# Patient Record
Sex: FEMALE | Race: OTHER
[De-identification: names, ages, dates, MRNs, and addresses within clinical notes are randomized per-mention and may not be internally consistent; named-entity substitution may affect disease eponyms.]

---

## 2017-09-05 ENCOUNTER — HOSPITAL ENCOUNTER (INPATIENT)
Dept: HOSPITAL 10 - E/R | Age: 82
LOS: 4 days | Discharge: ANOTHER HOSPITAL | DRG: 871 | End: 2017-09-09
Payer: MEDICARE

## 2017-09-05 VITALS — DIASTOLIC BLOOD PRESSURE: 93 MMHG | RESPIRATION RATE: 17 BRPM | SYSTOLIC BLOOD PRESSURE: 127 MMHG | HEART RATE: 86 BPM

## 2017-09-05 VITALS — HEART RATE: 70 BPM | DIASTOLIC BLOOD PRESSURE: 85 MMHG | RESPIRATION RATE: 25 BRPM | SYSTOLIC BLOOD PRESSURE: 125 MMHG

## 2017-09-05 VITALS — DIASTOLIC BLOOD PRESSURE: 67 MMHG | HEART RATE: 73 BPM | RESPIRATION RATE: 19 BRPM | SYSTOLIC BLOOD PRESSURE: 116 MMHG

## 2017-09-05 VITALS — DIASTOLIC BLOOD PRESSURE: 77 MMHG | RESPIRATION RATE: 21 BRPM | HEART RATE: 78 BPM | SYSTOLIC BLOOD PRESSURE: 131 MMHG

## 2017-09-05 VITALS — HEART RATE: 74 BPM | SYSTOLIC BLOOD PRESSURE: 106 MMHG | RESPIRATION RATE: 17 BRPM | DIASTOLIC BLOOD PRESSURE: 80 MMHG

## 2017-09-05 VITALS — RESPIRATION RATE: 19 BRPM | DIASTOLIC BLOOD PRESSURE: 68 MMHG | SYSTOLIC BLOOD PRESSURE: 98 MMHG | HEART RATE: 83 BPM

## 2017-09-05 VITALS — SYSTOLIC BLOOD PRESSURE: 117 MMHG | RESPIRATION RATE: 20 BRPM | HEART RATE: 78 BPM | DIASTOLIC BLOOD PRESSURE: 71 MMHG

## 2017-09-05 VITALS — SYSTOLIC BLOOD PRESSURE: 115 MMHG | DIASTOLIC BLOOD PRESSURE: 69 MMHG | HEART RATE: 91 BPM | RESPIRATION RATE: 20 BRPM

## 2017-09-05 VITALS — DIASTOLIC BLOOD PRESSURE: 71 MMHG | SYSTOLIC BLOOD PRESSURE: 115 MMHG | RESPIRATION RATE: 24 BRPM | HEART RATE: 79 BPM

## 2017-09-05 VITALS — DIASTOLIC BLOOD PRESSURE: 67 MMHG | HEART RATE: 81 BPM | SYSTOLIC BLOOD PRESSURE: 109 MMHG | RESPIRATION RATE: 20 BRPM

## 2017-09-05 VITALS — DIASTOLIC BLOOD PRESSURE: 73 MMHG | SYSTOLIC BLOOD PRESSURE: 118 MMHG | HEART RATE: 72 BPM | RESPIRATION RATE: 24 BRPM

## 2017-09-05 VITALS — RESPIRATION RATE: 20 BRPM | HEART RATE: 70 BPM | DIASTOLIC BLOOD PRESSURE: 65 MMHG | SYSTOLIC BLOOD PRESSURE: 122 MMHG

## 2017-09-05 VITALS — RESPIRATION RATE: 19 BRPM | DIASTOLIC BLOOD PRESSURE: 68 MMHG | HEART RATE: 75 BPM | SYSTOLIC BLOOD PRESSURE: 112 MMHG

## 2017-09-05 VITALS — HEART RATE: 74 BPM | DIASTOLIC BLOOD PRESSURE: 92 MMHG | RESPIRATION RATE: 24 BRPM | SYSTOLIC BLOOD PRESSURE: 141 MMHG

## 2017-09-05 VITALS — RESPIRATION RATE: 19 BRPM | DIASTOLIC BLOOD PRESSURE: 88 MMHG | SYSTOLIC BLOOD PRESSURE: 113 MMHG | HEART RATE: 81 BPM

## 2017-09-05 VITALS — RESPIRATION RATE: 22 BRPM | SYSTOLIC BLOOD PRESSURE: 117 MMHG | DIASTOLIC BLOOD PRESSURE: 74 MMHG | HEART RATE: 83 BPM

## 2017-09-05 VITALS — RESPIRATION RATE: 22 BRPM | HEART RATE: 85 BPM | SYSTOLIC BLOOD PRESSURE: 108 MMHG | DIASTOLIC BLOOD PRESSURE: 70 MMHG

## 2017-09-05 VITALS — HEART RATE: 82 BPM | RESPIRATION RATE: 21 BRPM | SYSTOLIC BLOOD PRESSURE: 134 MMHG | DIASTOLIC BLOOD PRESSURE: 81 MMHG

## 2017-09-05 VITALS — HEART RATE: 72 BPM | RESPIRATION RATE: 24 BRPM | DIASTOLIC BLOOD PRESSURE: 63 MMHG | SYSTOLIC BLOOD PRESSURE: 123 MMHG

## 2017-09-05 VITALS — SYSTOLIC BLOOD PRESSURE: 111 MMHG | DIASTOLIC BLOOD PRESSURE: 62 MMHG | HEART RATE: 75 BPM | RESPIRATION RATE: 20 BRPM

## 2017-09-05 VITALS — RESPIRATION RATE: 12 BRPM | HEART RATE: 73 BPM | DIASTOLIC BLOOD PRESSURE: 76 MMHG | SYSTOLIC BLOOD PRESSURE: 122 MMHG

## 2017-09-05 VITALS — DIASTOLIC BLOOD PRESSURE: 59 MMHG | SYSTOLIC BLOOD PRESSURE: 94 MMHG | HEART RATE: 79 BPM | RESPIRATION RATE: 20 BRPM

## 2017-09-05 VITALS
HEIGHT: 60 IN | BODY MASS INDEX: 32.12 KG/M2 | BODY MASS INDEX: 32.12 KG/M2 | HEIGHT: 60 IN | WEIGHT: 163.58 LBS | WEIGHT: 163.58 LBS

## 2017-09-05 VITALS — HEART RATE: 82 BPM | RESPIRATION RATE: 20 BRPM | DIASTOLIC BLOOD PRESSURE: 64 MMHG | SYSTOLIC BLOOD PRESSURE: 112 MMHG

## 2017-09-05 VITALS — SYSTOLIC BLOOD PRESSURE: 100 MMHG | DIASTOLIC BLOOD PRESSURE: 73 MMHG | HEART RATE: 93 BPM | RESPIRATION RATE: 24 BRPM

## 2017-09-05 VITALS — RESPIRATION RATE: 22 BRPM | HEART RATE: 72 BPM | SYSTOLIC BLOOD PRESSURE: 120 MMHG | DIASTOLIC BLOOD PRESSURE: 73 MMHG

## 2017-09-05 VITALS — HEART RATE: 74 BPM | RESPIRATION RATE: 12 BRPM | DIASTOLIC BLOOD PRESSURE: 79 MMHG | SYSTOLIC BLOOD PRESSURE: 129 MMHG

## 2017-09-05 VITALS — RESPIRATION RATE: 16 BRPM | SYSTOLIC BLOOD PRESSURE: 125 MMHG | HEART RATE: 79 BPM | DIASTOLIC BLOOD PRESSURE: 83 MMHG

## 2017-09-05 VITALS — RESPIRATION RATE: 21 BRPM | DIASTOLIC BLOOD PRESSURE: 65 MMHG | SYSTOLIC BLOOD PRESSURE: 101 MMHG | HEART RATE: 74 BPM

## 2017-09-05 VITALS — HEART RATE: 79 BPM | DIASTOLIC BLOOD PRESSURE: 86 MMHG | RESPIRATION RATE: 11 BRPM | SYSTOLIC BLOOD PRESSURE: 112 MMHG

## 2017-09-05 VITALS — RESPIRATION RATE: 19 BRPM | HEART RATE: 82 BPM | DIASTOLIC BLOOD PRESSURE: 83 MMHG | SYSTOLIC BLOOD PRESSURE: 125 MMHG

## 2017-09-05 VITALS — HEART RATE: 76 BPM | DIASTOLIC BLOOD PRESSURE: 89 MMHG | SYSTOLIC BLOOD PRESSURE: 133 MMHG | RESPIRATION RATE: 24 BRPM

## 2017-09-05 VITALS — DIASTOLIC BLOOD PRESSURE: 77 MMHG | HEART RATE: 80 BPM | SYSTOLIC BLOOD PRESSURE: 117 MMHG | RESPIRATION RATE: 22 BRPM

## 2017-09-05 VITALS — SYSTOLIC BLOOD PRESSURE: 121 MMHG | RESPIRATION RATE: 25 BRPM | DIASTOLIC BLOOD PRESSURE: 89 MMHG | HEART RATE: 73 BPM

## 2017-09-05 VITALS — DIASTOLIC BLOOD PRESSURE: 65 MMHG | RESPIRATION RATE: 22 BRPM | SYSTOLIC BLOOD PRESSURE: 110 MMHG | HEART RATE: 77 BPM

## 2017-09-05 VITALS — RESPIRATION RATE: 21 BRPM | SYSTOLIC BLOOD PRESSURE: 137 MMHG | DIASTOLIC BLOOD PRESSURE: 84 MMHG | HEART RATE: 82 BPM

## 2017-09-05 VITALS — HEART RATE: 77 BPM | SYSTOLIC BLOOD PRESSURE: 107 MMHG | RESPIRATION RATE: 25 BRPM | DIASTOLIC BLOOD PRESSURE: 69 MMHG

## 2017-09-05 VITALS — DIASTOLIC BLOOD PRESSURE: 93 MMHG | HEART RATE: 74 BPM | RESPIRATION RATE: 20 BRPM | SYSTOLIC BLOOD PRESSURE: 130 MMHG

## 2017-09-05 VITALS — RESPIRATION RATE: 22 BRPM | SYSTOLIC BLOOD PRESSURE: 135 MMHG | DIASTOLIC BLOOD PRESSURE: 78 MMHG | HEART RATE: 78 BPM

## 2017-09-05 VITALS — DIASTOLIC BLOOD PRESSURE: 96 MMHG | SYSTOLIC BLOOD PRESSURE: 138 MMHG | HEART RATE: 73 BPM | RESPIRATION RATE: 24 BRPM

## 2017-09-05 VITALS — DIASTOLIC BLOOD PRESSURE: 66 MMHG | RESPIRATION RATE: 22 BRPM | HEART RATE: 72 BPM | SYSTOLIC BLOOD PRESSURE: 104 MMHG

## 2017-09-05 VITALS — SYSTOLIC BLOOD PRESSURE: 115 MMHG | HEART RATE: 76 BPM | DIASTOLIC BLOOD PRESSURE: 82 MMHG | RESPIRATION RATE: 21 BRPM

## 2017-09-05 VITALS — RESPIRATION RATE: 22 BRPM | SYSTOLIC BLOOD PRESSURE: 115 MMHG | HEART RATE: 80 BPM | DIASTOLIC BLOOD PRESSURE: 61 MMHG

## 2017-09-05 VITALS — RESPIRATION RATE: 23 BRPM | SYSTOLIC BLOOD PRESSURE: 101 MMHG | DIASTOLIC BLOOD PRESSURE: 77 MMHG | HEART RATE: 88 BPM

## 2017-09-05 VITALS — SYSTOLIC BLOOD PRESSURE: 106 MMHG | DIASTOLIC BLOOD PRESSURE: 71 MMHG | HEART RATE: 77 BPM | RESPIRATION RATE: 21 BRPM

## 2017-09-05 VITALS — TEMPERATURE: 98.4 F

## 2017-09-05 VITALS — RESPIRATION RATE: 25 BRPM | SYSTOLIC BLOOD PRESSURE: 113 MMHG | HEART RATE: 78 BPM | DIASTOLIC BLOOD PRESSURE: 61 MMHG

## 2017-09-05 VITALS — SYSTOLIC BLOOD PRESSURE: 120 MMHG | RESPIRATION RATE: 20 BRPM | DIASTOLIC BLOOD PRESSURE: 62 MMHG | HEART RATE: 78 BPM

## 2017-09-05 VITALS — RESPIRATION RATE: 25 BRPM | DIASTOLIC BLOOD PRESSURE: 74 MMHG | SYSTOLIC BLOOD PRESSURE: 131 MMHG | HEART RATE: 74 BPM

## 2017-09-05 VITALS — DIASTOLIC BLOOD PRESSURE: 69 MMHG | HEART RATE: 72 BPM | RESPIRATION RATE: 23 BRPM | SYSTOLIC BLOOD PRESSURE: 118 MMHG

## 2017-09-05 VITALS — HEART RATE: 76 BPM | RESPIRATION RATE: 25 BRPM | DIASTOLIC BLOOD PRESSURE: 85 MMHG | SYSTOLIC BLOOD PRESSURE: 124 MMHG

## 2017-09-05 VITALS — HEART RATE: 77 BPM | RESPIRATION RATE: 17 BRPM | SYSTOLIC BLOOD PRESSURE: 100 MMHG | DIASTOLIC BLOOD PRESSURE: 43 MMHG

## 2017-09-05 VITALS — DIASTOLIC BLOOD PRESSURE: 60 MMHG | RESPIRATION RATE: 16 BRPM | SYSTOLIC BLOOD PRESSURE: 103 MMHG | HEART RATE: 73 BPM

## 2017-09-05 VITALS — DIASTOLIC BLOOD PRESSURE: 64 MMHG | HEART RATE: 79 BPM | RESPIRATION RATE: 25 BRPM | SYSTOLIC BLOOD PRESSURE: 104 MMHG

## 2017-09-05 VITALS — SYSTOLIC BLOOD PRESSURE: 112 MMHG | HEART RATE: 87 BPM | RESPIRATION RATE: 25 BRPM | DIASTOLIC BLOOD PRESSURE: 73 MMHG

## 2017-09-05 VITALS — DIASTOLIC BLOOD PRESSURE: 72 MMHG | RESPIRATION RATE: 18 BRPM | HEART RATE: 74 BPM | SYSTOLIC BLOOD PRESSURE: 111 MMHG

## 2017-09-05 VITALS — RESPIRATION RATE: 22 BRPM | HEART RATE: 79 BPM | DIASTOLIC BLOOD PRESSURE: 66 MMHG | SYSTOLIC BLOOD PRESSURE: 112 MMHG

## 2017-09-05 VITALS — SYSTOLIC BLOOD PRESSURE: 116 MMHG | HEART RATE: 75 BPM | DIASTOLIC BLOOD PRESSURE: 88 MMHG | RESPIRATION RATE: 23 BRPM

## 2017-09-05 VITALS — DIASTOLIC BLOOD PRESSURE: 59 MMHG | SYSTOLIC BLOOD PRESSURE: 108 MMHG | HEART RATE: 77 BPM | RESPIRATION RATE: 21 BRPM

## 2017-09-05 VITALS — SYSTOLIC BLOOD PRESSURE: 121 MMHG | HEART RATE: 82 BPM | DIASTOLIC BLOOD PRESSURE: 98 MMHG | RESPIRATION RATE: 20 BRPM

## 2017-09-05 DIAGNOSIS — B96.5: ICD-10-CM

## 2017-09-05 DIAGNOSIS — Z95.1: ICD-10-CM

## 2017-09-05 DIAGNOSIS — Z79.84: ICD-10-CM

## 2017-09-05 DIAGNOSIS — E11.9: ICD-10-CM

## 2017-09-05 DIAGNOSIS — B96.20: ICD-10-CM

## 2017-09-05 DIAGNOSIS — I21.3: ICD-10-CM

## 2017-09-05 DIAGNOSIS — A41.9: Primary | ICD-10-CM

## 2017-09-05 DIAGNOSIS — I10: ICD-10-CM

## 2017-09-05 DIAGNOSIS — N39.0: ICD-10-CM

## 2017-09-05 DIAGNOSIS — I24.8: ICD-10-CM

## 2017-09-05 DIAGNOSIS — K80.30: ICD-10-CM

## 2017-09-05 DIAGNOSIS — R65.21: ICD-10-CM

## 2017-09-05 DIAGNOSIS — I25.10: ICD-10-CM

## 2017-09-05 DIAGNOSIS — Z79.02: ICD-10-CM

## 2017-09-05 DIAGNOSIS — I48.2: ICD-10-CM

## 2017-09-05 DIAGNOSIS — Z95.2: ICD-10-CM

## 2017-09-05 DIAGNOSIS — E78.5: ICD-10-CM

## 2017-09-05 DIAGNOSIS — Z79.82: ICD-10-CM

## 2017-09-05 LAB
ABNORMAL IP MESSAGE: 1
ABNORMAL IP MESSAGE: 1
ADD UMIC: YES
ALBUMIN SERPL-MCNC: 1.8 G/DL (ref 3.3–4.9)
ALBUMIN SERPL-MCNC: 3.7 G/DL (ref 3.3–4.9)
ALBUMIN/GLOB SERPL: 0.86 {RATIO}
ALP SERPL-CCNC: 128 IU/L (ref 42–121)
ALP SERPL-CCNC: 342 IU/L (ref 42–121)
ALT SERPL-CCNC: 216 IU/L (ref 13–69)
ALT SERPL-CCNC: 426 IU/L (ref 13–69)
ANION GAP SERPL CALC-SCNC: 14 MMOL/L (ref 8–16)
ANION GAP SERPL CALC-SCNC: 18 MMOL/L (ref 8–16)
APTT BLD: 32.4 SEC (ref 25–35)
APTT BLD: 35.5 SEC (ref 25–35)
AST SERPL-CCNC: 150 IU/L (ref 15–46)
AST SERPL-CCNC: 350 IU/L (ref 15–46)
BACTERIA #/AREA URNS HPF: (no result) /HPF
BASOPHILS # BLD AUTO: 0 10^3/UL (ref 0–0.1)
BASOPHILS # BLD AUTO: 0 10^3/UL (ref 0–0.1)
BASOPHILS NFR BLD: 0.1 % (ref 0–2)
BASOPHILS NFR BLD: 0.2 % (ref 0–2)
BILIRUB DIRECT SERPL-MCNC: 1.2 MG/DL (ref 0–0.2)
BILIRUB DIRECT SERPL-MCNC: 2.9 MG/DL (ref 0–0.2)
BILIRUB SERPL-MCNC: 2 MG/DL (ref 0.2–1.3)
BILIRUB SERPL-MCNC: 4.6 MG/DL (ref 0.2–1.3)
BUN SERPL-MCNC: 28 MG/DL (ref 7–20)
BUN SERPL-MCNC: 28 MG/DL (ref 7–20)
CALCIUM SERPL-MCNC: 7.3 MG/DL (ref 8.4–10.2)
CALCIUM SERPL-MCNC: 8.9 MG/DL (ref 8.4–10.2)
CHLORIDE SERPL-SCNC: 104 MMOL/L (ref 97–110)
CHLORIDE SERPL-SCNC: 110 MMOL/L (ref 97–110)
CK MB SERPL-MCNC: 1.46 NG/ML (ref 0–2.4)
CK MB SERPL-MCNC: 1.66 NG/ML (ref 0–2.4)
CK MB SERPL-MCNC: 1.74 NG/ML (ref 0–2.4)
CK SERPL-CCNC: 34 IU/L (ref 23–200)
CK SERPL-CCNC: 37 IU/L (ref 23–200)
CK SERPL-CCNC: 39 IU/L (ref 23–200)
CO2 SERPL-SCNC: 19 MMOL/L (ref 21–31)
CO2 SERPL-SCNC: 19 MMOL/L (ref 21–31)
COLOR UR: (no result)
CREAT SERPL-MCNC: 1.24 MG/DL (ref 0.44–1)
CREAT SERPL-MCNC: 1.37 MG/DL (ref 0.44–1)
EOSINOPHIL # BLD: 0 10^3/UL (ref 0–0.5)
EOSINOPHIL # BLD: 0.1 10^3/UL (ref 0–0.5)
EOSINOPHIL NFR BLD: 0.1 % (ref 0–7)
EOSINOPHIL NFR BLD: 1 % (ref 0–7)
ERYTHROCYTE [DISTWIDTH] IN BLOOD BY AUTOMATED COUNT: 14.6 % (ref 11.5–14.5)
ERYTHROCYTE [DISTWIDTH] IN BLOOD BY AUTOMATED COUNT: 15 % (ref 11.5–14.5)
GLOBULIN SER-MCNC: 4.3 G/DL (ref 1.3–3.2)
GLUCOSE SERPL-MCNC: 140 MG/DL (ref 70–220)
GLUCOSE SERPL-MCNC: 174 MG/DL (ref 70–220)
GLUCOSE UR STRIP-MCNC: NEGATIVE MG/DL
HCT VFR BLD CALC: 32.8 % (ref 37–47)
HCT VFR BLD CALC: 37.4 % (ref 37–47)
HGB BLD-MCNC: 10.4 G/DL (ref 12–16)
HGB BLD-MCNC: 12.7 G/DL (ref 12–16)
INR PPP: 1.36
INR PPP: 1.42
KETONES UR STRIP.AUTO-MCNC: NEGATIVE MG/DL
LYMPHOCYTES # BLD AUTO: 0.3 10^3/UL (ref 0.8–2.9)
LYMPHOCYTES # BLD AUTO: 0.5 10^3/UL (ref 0.8–2.9)
LYMPHOCYTES NFR BLD AUTO: 4.1 % (ref 15–51)
LYMPHOCYTES NFR BLD AUTO: 6.2 % (ref 15–51)
MCH RBC QN AUTO: 27.7 PG (ref 29–33)
MCH RBC QN AUTO: 28.7 PG (ref 29–33)
MCHC RBC AUTO-ENTMCNC: 31.7 G/DL (ref 32–37)
MCHC RBC AUTO-ENTMCNC: 34 G/DL (ref 32–37)
MCV RBC AUTO: 84.6 FL (ref 82–101)
MCV RBC AUTO: 87.5 FL (ref 82–101)
MONOCYTES # BLD: 0.1 10^3/UL (ref 0.3–0.9)
MONOCYTES # BLD: 0.5 10^3/UL (ref 0.3–0.9)
MONOCYTES NFR BLD: 1.9 % (ref 0–11)
MONOCYTES NFR BLD: 4.1 % (ref 0–11)
MUCOUS THREADS #/AREA URNS HPF: (no result) /HPF
NEUTROPHILS NFR BLD AUTO: 90.3 % (ref 39–77)
NEUTROPHILS NFR BLD AUTO: 91.1 % (ref 39–77)
NITRITE UR QL STRIP.AUTO: NEGATIVE MG/DL
NRBC # BLD MANUAL: 0 10^3/UL (ref 0–0)
NRBC # BLD MANUAL: 0 10^3/UL (ref 0–0)
NRBC BLD AUTO-RTO: 0 /100WBC (ref 0–0)
NRBC BLD AUTO-RTO: 0 /100WBC (ref 0–0)
PLATELET # BLD: 109 10^3/UL (ref 140–415)
PLATELET # BLD: 149 10^3/UL (ref 140–415)
PMV BLD AUTO: 9.5 FL (ref 7.4–10.4)
PMV BLD AUTO: 9.7 FL (ref 7.4–10.4)
POSITIVE DIFF: (no result)
POSITIVE DIFF: (no result)
POTASSIUM SERPL-SCNC: 3.7 MMOL/L (ref 3.5–5.1)
POTASSIUM SERPL-SCNC: 4 MMOL/L (ref 3.5–5.1)
PROT SERPL-MCNC: 4.3 G/DL (ref 6.1–8.1)
PROT SERPL-MCNC: 8 G/DL (ref 6.1–8.1)
PROTHROMBIN TIME: 16.8 SEC (ref 12.2–14.2)
PROTHROMBIN TIME: 17.4 SEC (ref 12.2–14.2)
PT RATIO: 1.3
PT RATIO: 1.4
RBC # BLD AUTO: 3.75 10^6/UL (ref 4.2–5.4)
RBC # BLD AUTO: 4.42 10^6/UL (ref 4.2–5.4)
RBC # UR AUTO: (no result) MG/DL
SODIUM SERPL-SCNC: 137 MMOL/L (ref 135–144)
SODIUM SERPL-SCNC: 139 MMOL/L (ref 135–144)
SQUAMOUS #/AREA URNS HPF: (no result) /HPF
TROPONIN I SERPL-MCNC: 0.04 NG/ML (ref 0–0.12)
TROPONIN I SERPL-MCNC: 0.12 NG/ML (ref 0–0.12)
TROPONIN I SERPL-MCNC: 0.13 NG/ML (ref 0–0.12)
TROPONIN I SERPL-MCNC: 0.22 NG/ML (ref 0–0.12)
UR ASCORBIC ACID: NEGATIVE MG/DL
UR BILIRUBIN (DIP): (no result) MG/DL
UR BUDDING YEAST: (no result) /HPF
UR CLARITY: (no result)
UR PH (DIP): 5 (ref 5–9)
UR RBC: 40 /HPF (ref 0–5)
UR SPECIFIC GRAVITY (DIP): 1.02 (ref 1–1.03)
UR TOTAL PROTEIN (DIP): (no result) MG/DL
URINE BLOOD (DIP) POC: (no result)
UROBILINOGEN UR STRIP-ACNC: (no result) MG/DL
WBC # BLD AUTO: 11.5 10^3/UL (ref 4.8–10.8)
WBC # BLD AUTO: 5.1 10^3/UL (ref 4.8–10.8)
WBC # UR STRIP: (no result) LEU/UL

## 2017-09-05 PROCEDURE — 84443 ASSAY THYROID STIM HORMONE: CPT

## 2017-09-05 PROCEDURE — C9113 INJ PANTOPRAZOLE SODIUM, VIA: HCPCS

## 2017-09-05 PROCEDURE — 96375 TX/PRO/DX INJ NEW DRUG ADDON: CPT

## 2017-09-05 PROCEDURE — 85025 COMPLETE CBC W/AUTO DIFF WBC: CPT

## 2017-09-05 PROCEDURE — 87040 BLOOD CULTURE FOR BACTERIA: CPT

## 2017-09-05 PROCEDURE — 83605 ASSAY OF LACTIC ACID: CPT

## 2017-09-05 PROCEDURE — 84100 ASSAY OF PHOSPHORUS: CPT

## 2017-09-05 PROCEDURE — 93005 ELECTROCARDIOGRAM TRACING: CPT

## 2017-09-05 PROCEDURE — 92610 EVALUATE SWALLOWING FUNCTION: CPT

## 2017-09-05 PROCEDURE — 85610 PROTHROMBIN TIME: CPT

## 2017-09-05 PROCEDURE — 81003 URINALYSIS AUTO W/O SCOPE: CPT

## 2017-09-05 PROCEDURE — 80162 ASSAY OF DIGOXIN TOTAL: CPT

## 2017-09-05 PROCEDURE — 80048 BASIC METABOLIC PNL TOTAL CA: CPT

## 2017-09-05 PROCEDURE — 86901 BLOOD TYPING SEROLOGIC RH(D): CPT

## 2017-09-05 PROCEDURE — P9059 PLASMA, FRZ BETWEEN 8-24HOUR: HCPCS

## 2017-09-05 PROCEDURE — 36430 TRANSFUSION BLD/BLD COMPNT: CPT

## 2017-09-05 PROCEDURE — 71010: CPT

## 2017-09-05 PROCEDURE — 06HM33Z INSERTION OF INFUSION DEVICE INTO RIGHT FEMORAL VEIN, PERCUTANEOUS APPROACH: ICD-10-PCS

## 2017-09-05 PROCEDURE — 82150 ASSAY OF AMYLASE: CPT

## 2017-09-05 PROCEDURE — 87086 URINE CULTURE/COLONY COUNT: CPT

## 2017-09-05 PROCEDURE — 83735 ASSAY OF MAGNESIUM: CPT

## 2017-09-05 PROCEDURE — 74330 X-RAY BILE/PANC ENDOSCOPY: CPT

## 2017-09-05 PROCEDURE — 80076 HEPATIC FUNCTION PANEL: CPT

## 2017-09-05 PROCEDURE — 82550 ASSAY OF CK (CPK): CPT

## 2017-09-05 PROCEDURE — 85730 THROMBOPLASTIN TIME PARTIAL: CPT

## 2017-09-05 PROCEDURE — 84484 ASSAY OF TROPONIN QUANT: CPT

## 2017-09-05 PROCEDURE — 36415 COLL VENOUS BLD VENIPUNCTURE: CPT

## 2017-09-05 PROCEDURE — 83690 ASSAY OF LIPASE: CPT

## 2017-09-05 PROCEDURE — C2617 STENT, NON-COR, TEM W/O DEL: HCPCS

## 2017-09-05 PROCEDURE — 86900 BLOOD TYPING SEROLOGIC ABO: CPT

## 2017-09-05 PROCEDURE — 74176 CT ABD & PELVIS W/O CONTRAST: CPT

## 2017-09-05 PROCEDURE — 80053 COMPREHEN METABOLIC PANEL: CPT

## 2017-09-05 PROCEDURE — 82553 CREATINE MB FRACTION: CPT

## 2017-09-05 PROCEDURE — 93306 TTE W/DOPPLER COMPLETE: CPT

## 2017-09-05 PROCEDURE — 83036 HEMOGLOBIN GLYCOSYLATED A1C: CPT

## 2017-09-05 PROCEDURE — B54BZZA ULTRASONOGRAPHY OF RIGHT LOWER EXTREMITY VEINS, GUIDANCE: ICD-10-PCS

## 2017-09-05 PROCEDURE — 86850 RBC ANTIBODY SCREEN: CPT

## 2017-09-05 PROCEDURE — 82962 GLUCOSE BLOOD TEST: CPT

## 2017-09-05 PROCEDURE — 81001 URINALYSIS AUTO W/SCOPE: CPT

## 2017-09-05 PROCEDURE — 96365 THER/PROPH/DIAG IV INF INIT: CPT

## 2017-09-05 RX ADMIN — SERTRALINE HYDROCHLORIDE SCH MG: 50 TABLET ORAL at 21:17

## 2017-09-05 RX ADMIN — FLUCONAZOLE, SODIUM CHLORIDE SCH MLS/HR: 2 INJECTION INTRAVENOUS at 08:25

## 2017-09-05 RX ADMIN — FOLIC ACID SCH MLS/HR: 5 INJECTION, SOLUTION INTRAMUSCULAR; INTRAVENOUS; SUBCUTANEOUS at 10:15

## 2017-09-05 RX ADMIN — FOLIC ACID SCH MLS/HR: 5 INJECTION, SOLUTION INTRAMUSCULAR; INTRAVENOUS; SUBCUTANEOUS at 22:30

## 2017-09-05 RX ADMIN — MULTIPLE VITAMINS W/ MINERALS TAB SCH TAB: TAB at 10:14

## 2017-09-05 RX ADMIN — FERROUS SULFATE TAB 325 MG (65 MG ELEMENTAL FE) SCH MG: 325 (65 FE) TAB at 10:14

## 2017-09-05 RX ADMIN — Medication STA MLS/HR: at 08:35

## 2017-09-05 RX ADMIN — Medication STA MLS/HR: at 07:37

## 2017-09-05 RX ADMIN — MEROPENEM SCH MLS/HR: 1 INJECTION, POWDER, FOR SOLUTION INTRAVENOUS at 21:22

## 2017-09-05 RX ADMIN — ASPIRIN SCH MG: 81 TABLET, COATED ORAL at 10:14

## 2017-09-05 RX ADMIN — MEROPENEM SCH MLS/HR: 1 INJECTION, POWDER, FOR SOLUTION INTRAVENOUS at 11:15

## 2017-09-05 RX ADMIN — MAGNESIUM OXIDE TAB 400 MG (241.3 MG ELEMENTAL MG) SCH MG: 400 (241.3 MG) TAB at 10:14

## 2017-09-05 NOTE — RADRPT
PROCEDURE:   CT Abdomen and pelvis without contrast. 

 

CLINICAL INDICATION:   Abdominal pain. 

 

TECHNIQUE:    CT scan of the abdomen and pelvis was performed on a multi-detector high-resolution CT
 scanner.  Contiguous axial images were obtained from the lung bases to the ischial tuberosities wit
hout intravenous contrast.  Coronal and sagittal reformatted images were also obtained.  Images were
 reviewed on the PACS workstation.

 

One or more of the following dose reduction techniques were used:

- Automated exposure control.

- Adjustment of the mA and/or kV according to patient size.

- Use of iterative reconstruction technique.

 

Exam CTD/vol = 20.31 mGy.

Total exam DLP = 2367.45 mGy-cm.   

 

COMPARISON:   03/27/2016. 

 

FINDINGS:

Evaluation of the lung bases demonstrates mild bibasilar atelectasis.  The heart is moderately enlar
ged with coronary artery calcifications. There is dilatation of the ascending aorta measuring up to 
5.0 cm in diameter.

 

Abdomen:  The liver is normal in size with no focal mass identified.  The patient is status post cho
lecystectomy.  There is moderate dilatation of the biliary tree with the common bile duct measuring 
up to 2.6 cm.  There are small calculi within the common bile duct.  There is hyperdensity within th
e distal common bile duct at the level of the pancreatic head measuring 12 x 9 mm.  The spleen, panc
reas and bilateral adrenal glands are within normal limits.  Bilateral kidneys are normal in size wi
th multiple cysts.  There is no radiopaque renal or ureteral calculus identified.  There is no hydro
nephrosis or hydroureter.  There is no retroperitoneal adenopathy.  The abdominal aorta is of normal
 caliber with scattered atherosclerotic calcifications.

 

There is no abnormal bowel wall thickening or distension.  There is no bowel obstruction or free air
.  A normal appendix is identified.  There is no diverticulosis or diverticulitis.  There is no asci
keron.

 

Pelvis:  The bladder contains a Connelly catheter.  The uterus and adnexa are within normal limits.  Th
ere is no significant pelvic adenopathy or free fluid.

 

Evaluation of the osseous structures demonstrates no suspicious lytic or blastic lesion. There are m
oderate compression deformities of the L1 and L2 vertebral bodies with up to 70% loss in vertebral b
carlie heights with evidence of prior vertebroplasty.  There are mild to moderate compression deformiti
es of the T12 and L3 vertebral bodies.  The patient is status post total right hip arthroplasty.

 

IMPRESSION:

Moderate dilatation of the biliary tree with evidence of choledocholithiasis, unchanged from 03/27/2
016. There is hyperdensity within the distal common bile duct at the level of the pancreatic head wh
ich could suggest stones or debris.

Mild bibasilar atelectasis.

Moderate cardiomegaly.

Vascular calcifications reflective of atherosclerosis.

Bilateral renal cysts.

Multilevel mild to moderate compression deformities of the thoracolumbar spine, stable compared with
 03/27/2016.

Dilated ascending aorta measuring up to 5.0 cm, unchanged.

_____________________________________________ 

.Jonah Dye MD, MD           Date    Time 

Electronically viewed and signed by .Jonah Dye MD, MD on 09/05/2017 03:57 

 

D:  09/05/2017 03:57  T:  09/05/2017 03:57

.T/

## 2017-09-05 NOTE — QN
Documentation


Comment


Patient was signed out to me by Dr. Pang.  The patient has been 

persistently hypotensive despite 30 mL/kg fluid bolus.  Dr. Pang ordered a 

PICC line prior to my arrival.  I will upgrade the patient from telemetry to 

the ICU as the patient now has a diagnosis of septic shock.  The diagnosis of 

septic shock was made at 7:03AM.  Dr. Velazquez has been called for ICU orders.  I 

have ordered Levophed to be started as well to keep a blood pressure of a 

systolic greater than 90.





Volume reassessment:


Temperature 98.4, blood pressure 78/54, heart rate 94, respiratory rate 24, 99% 

oxygen saturation


Skin: Warm and dry


Pulses: 2+ palpable radial pulses


General: Mild distress


Cardiovascular: Regular rate and rhythm without murmur


Pulmonary: Decreased breath sounds bilaterally











FLYNN SALINAS MD Sep 5, 2017 07:06

## 2017-09-05 NOTE — RADRPT
PROCEDURE:   XR Chest. 

 

CLINICAL INDICATION:   Sepsis.  

 

TECHNIQUE:   Single frontal chest x-ray. 

 

COMPARISON:   03/30/2016 

 

FINDINGS:

Patient is rotated to the right.  The patient is status post time the.  Heart is enlarged.. There ar
e atherosclerotic calcifications of the aortic knob.  There is mild CHF.  There is no pleural effusi
on.  There is no pneumothorax.  The osseous structures are unremarkable.

 

IMPRESSION:

Cardiomegaly.  Mild CHF.

 

 

RPTAT:  HMVK

_____________________________________________ 

.Anselmo Finnegan MD, MD           Date    Time 

Electronically viewed and signed by .Anselmo Finnegan MD, MD on 09/05/2017 02:40 

 

D:  09/05/2017 02:40  T:  09/05/2017 02:40

.K/

## 2017-09-05 NOTE — ERA
ER Documentation


Chief Complaint


Date/Time


DATE: 17 


TIME: 05:39


Chief Complaint


abd pain, vomit, fever 105 at home, ALOC





HPI


This 81-year-old female with vomiting vomiting fever at home.  She is also been 

mildly altered for the family as she is been more lethargic.  Patient has 

history of choledocholithiasis and never had a Connelly cystectomy..  History of 

GERD.  The last 24 hours above fevers or chills.  Mild nausea.  2 episodes of 

vomiting which nonbilious nonbloody.  No other current complaints for the 

family.





ROS


All systems reviewed and are negative except as per history of present illness.





Medications


Home Meds


Active Scripts


Magnesium Oxide* (Mag-Oxide*) 400 Mg Tab, 400 MG PO DAILY for 30 Days, TAB


   Prov:CIERRA OLVERA         16


Metoprolol Tartrate* (Lopressor*) 25 Mg Tab, 25 MG PO BID for 30 Days, TAB


   Prov:CIERRA OLVERA         16


Ibuprofen* (Motrin*) 600 Mg Tab, 600 MG PO Q8 Y for FEVER GREATER THAN 100.6, #

30


   Prov:ANKIT LOUIE MD         7/20/15


Reported Medications


Metoclopramide* (Reglan*) 10 Mg Tablet, 10 MG PO Q6H Y for NAUSEA AND OR 

VOMITING, TAB


   3/27/16


Ferrous Sulfate* (Ferrous Sulfate*) 325 Mg Tablet, 325 MG PO DAILY, TAB


   7/20/15


Sertraline Hcl* (Zoloft*) 50 Mg Tablet, 25 MG PO QHS, TAB


   7/20/15


Aspirin* (Aspirin* EC) 81 Mg Tablet.dr, 81 MG PO DAILY, TAB


   7/20/15


Tramadol HCl (Tramadol HCl) 50 Mg Tab, 50 MG PO Q6H Y for PAIN, TAB


   7/20/15


Clopidogrel Bisulfate (Clopidogrel) 75 Mg Tablet, 75 MG PO DAILY, TAB


   7/20/15


Digoxin* (Digoxin*) 0.125 Mg Tab, 0.125 MG PO EVERY OTHER DAY, TAB


   If HR greater than 60


   7/20/15


Glimepiride* (Amaryl*) 1 Mg Tablet, 2 MG PO BID, TAB


   If Blood glucose greater than 150


   7/20/15


Multivitamins/Minerals* (Multivitamin w/Minerals*) 1 Tab Tablet, 1 TAB PO DAILY


   6/1/13


Nitroglycerin* (Nitroglycerin* SL) 0.4 Mg Tab.subl, 0.4 MG SL Q5 MINUTES X3 Y


   13


Metformin* (Glucophage*) 500 Mg Tab, 500 MG PO BID


   13


Pantoprazole* (Protonix*) 40 Mg Tablet.dr, 40 MG PO DAILY


   13


Discontinued Reported Medications


Insulin Regular, Human (Humulin R) 100 Units/Ml Vial, 0 SC SLIDING SCALE AC, 

VIAL


   7/20/15


Discontinued Scripts


Amlodipine Besylate* (Norvasc*) 10 Mg Tab, 10 MG PO DAILY for 30 Days, TAB


   Prov:CIERRA OLVERA         16





Allergies


Allergies:  


Coded Allergies:  


     No Known Allergy (Unverified , 17)





PMhx/Soc


History of Surgery:  Yes (ERCP, KYPHOPLASTY)


Anesthesia Reaction:  No


Hx Neurological Disorder:  No


Hx Respiratory Disorders:  No


Hx Cardiac Disorders:  Yes


Hx Psychiatric Problems:  No


Hx Miscellaneous Medical Probl:  Yes (CHOLECYSTITIS, BILE STONE, UTI, RT HIP FX)


Hx Alcohol Use:  No


Hx Substance Use:  No


Hx Tobacco Use:  No


Smoking Status:  Unknown if ever smoked





Physical Exam


Vitals





Vital Signs








  Date Time  Temp Pulse Resp B/P Pulse Ox O2 Delivery O2 Flow Rate FiO2


 


17 04:10 100.7 128 16 92/69 100 Nasal Cannula 5.0 


 


17 02:10      Non Rebreather 15 


 


17 01:55 103.4 177 32 139/99 88   








Physical Exam


Const:  []


Head:   Atraumatic 


Eyes:    Normal Conjunctiva


ENT:    Normal External Ears, Nose and Mouth.


Neck:               Full range of motion..~ No meningismus.


Resp:    Clear to auscultation bilaterally


Cardio:    Regular rate and rhythm, no murmurs


Abd:    Soft, non tender, non distended. Normal bowel sounds


Skin:    No petechiae or rashes


Back:    No midline or flank tenderness


Ext:    No cyanosis, or edema


Neur:    Awake and alert


Psych:    Normal Mood and Affect


Result Diagram:  


17 0200                                                                    

            17 0200





Results 24 hrs





 Laboratory Tests








Test


  17


02:00 17


02:20 17


02:32


 


White Blood Count 5.110^3/ul   


 


Red Blood Count 4.4210^6/ul   


 


Hemoglobin 12.7g/dl   


 


Hematocrit 37.4%   


 


Mean Corpuscular Volume 84.6fl   


 


Mean Corpuscular Hemoglobin 28.7pg   


 


Mean Corpuscular Hemoglobin


Concent 34.0g/dl 


  


  


 


 


Red Cell Distribution Width 14.6%   


 


Platelet Count 93006^3/UL   


 


Mean Platelet Volume 9.5fl   


 


Neutrophils % 90.3%   


 


Lymphocytes % 6.2%   


 


Monocytes % 1.9%   


 


Eosinophils % 1.0%   


 


Basophils % 0.2%   


 


Nucleated Red Blood Cells % 0.0/100WBC   


 


Neutrophils # (Manual) 4.610^3/ul   


 


Lymphocytes # 0.310^3/ul   


 


Monocytes # 0.110^3/ul   


 


Eosinophils # 0.110^3/ul   


 


Basophils # 0.010^3/ul   


 


Nucleated Red Blood Cells # 0.010^3/ul   


 


Prothrombin Time 16.8Sec   


 


Prothrombin Time Ratio 1.3   


 


INR International Normalized


Ratio 1.36 


  


  


 


 


Activated Partial


Thromboplast Time 32.4Sec 


  


  


 


 


Sodium Level 137mmol/L   


 


Potassium Level 3.7mmol/L   


 


Chloride Level 104mmol/L   


 


Carbon Dioxide Level 19mmol/L   


 


Anion Gap 18   


 


Blood Urea Nitrogen 28mg/dl   


 


Creatinine 1.24mg/dl   


 


Glucose Level 174mg/dl   


 


Lactic Acid Level 2.7mmol/L   


 


Calcium Level 8.9mg/dl   


 


Total Bilirubin 4.6mg/dl   


 


Direct Bilirubin 2.90mg/dl   


 


Indirect Bilirubin 1.7mg/dl   


 


Aspartate Amino Transf


(AST/SGOT) 350IU/L 


  


  


 


 


Alanine Aminotransferase


(ALT/SGPT) 426IU/L 


  


  


 


 


Alkaline Phosphatase 342IU/L   


 


Troponin I 0.038ng/ml   


 


Total Protein 8.0g/dl   


 


Albumin 3.7g/dl   


 


Globulin 4.30g/dl   


 


Albumin/Globulin Ratio 0.86   


 


Urine Color  PB  


 


Urine Clarity  CLOUDY  


 


Urine pH  5.0  


 


Urine Specific Gravity  1.017  


 


Urine Ketones  NEGATIVEmg/dL  


 


Urine Nitrite  NEGATIVEmg/dL  


 


Urine Bilirubin  1+mg/dL  


 


Urine Urobilinogen  2+mg/dL  


 


Urine Leukocyte Esterase  3+Nithya/ul  


 


Urine Microscopic RBC  40/HPF  


 


Urine Microscopic WBC  > 182/HPF  


 


Urine Squamous Epithelial


Cells 


  FEW/HPF 


  


 


 


Urine Bacteria  FEW/HPF  


 


Urine Mucus  FEW/HPF  


 


Urine Yeast (Budding)  MODERATE/HPF  


 


Urine Hemoglobin  2+mg/dL  


 


Urine Glucose  NEGATIVEmg/dL  


 


Urine Total Protein  2+mg/dl  


 


Bedside Urine pH (LAB)   5.5 


 


Bedside Urine Protein (LAB)   2+ 


 


Bedside Urine Glucose (UA)   Negative 


 


Bedside Urine Ketones (LAB)   Negative 


 


Bedside Urine Blood   2+ 


 


Bedside Urine Nitrite (LAB)   Positive 


 


Bedside Urine Leukocyte


Esterase (L 


  


  3+ 


 








 Current Medications








 Medications


  (Trade)  Dose


 Ordered  Sig/Sandor


 Route


 PRN Reason  Start Time


 Stop Time Status Last Admin


Dose Admin


 


 Acetaminophen 650


 mg  650 mg  ONCE  STAT


 MI


   17 01:59


 17 02:02 DC 17 02:17


 


 


 Sodium Chloride  2,170 ml @ 


 2,170 mls/hr  BOLUS X1 ONCE


 IV


   17 02:00


 17 02:59 DC 17 02:17


 


 


 Cefepime HCl  50 ml @ 


 100 mls/hr  ONCE  STAT


 IVPB


   17 04:11


 17 04:40 DC 17 04:55


 


 


 Vancomycin HCl


  (Vancocin)  250 ml @ 


 125 mls/hr  ONCE  ONCE


 IVPB


   17 04:30


 17 06:29   


 


 


 Pantoprazole


  (Protonix Tab)  40 mg  ONCE  ONCE


 PO


   17 05:00


 17 05:01 UNV  


 


 


 Dexamethasone


  (Decadron)  10 mg  ONCE  ONCE


 IV


   17 05:30


 17 05:31 DC  


 











Procedures/MDM


EKG: 


Rate/Rhythm:             Sinus tachycardia 134 QRS, ST, T-waves:    [No changes 

consistent w/ acute ischemia]


Impression:      [No evidence of ischemia or arrhythmia]





Chest X-ray 1V Interpreted by me:


Soft Tissue:                                               No acute 

abnormalities


Bones:                                                    No acute abnormalities


Mediastinum/Cardiac Silhouette/Lungs:     [No acute abnormalities]








Medical decision-[age]-year-old female with obvious choledocholithiasis 

along with elevation laboratory values for liver function tests, patient likely 

has biliary tree issues.  Reviewing the medical record, patient on this once in 

the past.  Patient will be admitted to [facility] physician on call Dr. Yo.  

Please to telemetry.





Patient's infectious symptoms have not stabilized and the patient is at risk of 

rapid decompensation.  The patient will be admitted for careful hydration, 

antibiotic therapy, and infectious source control.





Severe Sepsis Assessment:


Infectious Source:   Biliary tree


End organ damage indicated by:


[Lactate > 2.0 mmol/L





Severe Sepsis Managment: Blood Cultures X 2 before broad spectrum antibiotics 

initiated within 3 hours of recognition.


   


30 ml/kg NS bolus   Completed


Initial Lactate:           2.7 repeat Lactate        pending


Critical Care:


   Time:          45 minutes


   Treatments/Evaluations:    Emergent fluid management, while maintaining 

close respiratory support.  Immediate broad spectrum antibiotic therapy.  

Simultaneous assessment for possible sources in order to direct therapy.  

Consideration for invasive and chemical support to prevent respiratory or 

cardiac collapse.


                                                           


Accepting Care Team:


Current data and ongoing care discussed.


Time:                      4:45 AM 


primary Provider:      Sanaz 


consulting:                  [XOXOXO]


Outstanding Data:       none





Departure


Diagnosis:  


 Primary Impression:  


 Sepsis


 Qualified Code:  A41.9 - Sepsis, due to unspecified organism


 Additional Impression:  


 Choledocholithiasis


Condition:  Critical











ARMANDO DECKER Sep 5, 2017 05:42

## 2017-09-05 NOTE — HP
Date/Time of Note


Date/Time of Note


DATE: 9/5/17 


TIME: 09:16





Assessment/Plan


VTE Prophylaxis


VTE Prophylaxis Intervention:  SCD's





Lines/Catheters


Urinary Cath still in place:  No





Assessment/Plan


Assessment/Plan


81 years old female with:





1.  Choledocholithiasis, likely ascending cholangitis with sepsis, Dr Strong 

has been consulted, for now he is recommending IV antibiotics, patient will 

need ERCP when more stable, hopefully in the next 48 hours.


Continue current care


Follow up on cultures


Daughter at bedside has been updated





2.  Coronary artery disease, status post bypass surgery and aortic valve 

replacement.  Patient apparently was also complaining of possible chest pain 

therefore troponins are being trended.


Holding off beta-blockers due to hypotension





3.  Chronic atrial fibrillation, currently in sinus rhythm, continue digoxin, 

check digoxin level today.  No anticoagulation currently, patient has been on 

aspirin as an outpatient





4.  Diabetes mellitus: Sliding scale insulin, check A1c.





5.  Hypertension: Ongoing hypotension requiring pressors, hold off for all 

antihypertensive





Prophylaxis: Protonix for GI prophylaxis, SCDs for DVT prophylaxis


Disposition: ICU close monitoring, gastroenterology consult pending, continue 

IV antibiotics.





HPI/ROS


Admit Date/Time


Admit Date/Time


Sep 5, 2017 at 04:35





Hx of Present Illness


Chief complaint: Fevers, chills, nausea, vomiting





History of presenting illness: This is a 81-year-old female with history of 

coronary artery disease status post coronary artery bypass surgery and aortic 

valve replacement remotely, retention, diabetes mellitus, hyperlipidemia, 

status post a cholecystectomy March 2016 at [facility], presented with abdominal pain, 

nausea vomiting fevers and chills for 1 week now.


Patient reports that last week on Thursday she started having episodes of 

nausea and upper abdominal pain, intermittent severe pain.  Her daughter did 

confirm the symptoms.  The patient lives alone but family visits very 

frequently.  


According to the daughter the patient p.o. intake has decreased over the past 

few days, she had an episode of fever yesterday up to 102, along with chills 

nausea and vomiting.  She herself reports that her abdominal pain has been 

worsening.


The emergency department she was found to be hypotensive, did not really 

respond to 4 L bolus normal saline therefore she was started on Levophed for 

blood pressure support.  She has been febrile.  She denies chills currently.  

She still having abdominal pain.  CAT scan of the abdomen and pelvis is showing 

choledocholithiasis, clinically she fits cholangitis at this point.  Dr. Strong 

from gastroenterology was contacted and consulted, at this point the patient 

will need an ERCP when more stable, hopefully this can be done in the next 48 

hours.  She has been admitted to the intensive care unit.





ROS


Constitutional:  chills, fatigue, febrile, poor po


Cardiovascular:  no complaints


Gastrointestinal:  decreased appetite, pain (Her abdomen, intermittent), 

vomiting


Genitourinary:  no complaints


Musculoskeletal:  no complaints


Skin:  no complaints


Neurologic:  no complaints


Endocrine:  no complaints





PMH/Family/Social


Past Medical History


Diabetes mellitus


Hypertension


Hyperlipidemia


Coronary artery disease status post coronary artery bypass surgery and aortic 

valve replacement


Atrial fibrillation, chronic


Cholelithiasis





Past Surgical History


Status post cholecystectomy March 2016 at [facility]


Status post coronary artery bypass surgery and aortic valve replacement with 

bioprosthetic valve remotely





Family History


Significant Family History:  no pertinent family hx





Social History


Alcohol Use:  none


Smoking Status:  Never smoker


Drug Use:  none





Exam/Review of Systems


Vital Signs


Vitals





 Vital Signs








  Date Time  Temp Pulse Resp B/P Pulse Ox O2 Delivery O2 Flow Rate FiO2


 


9/5/17 08:44  85 24 116/84 100 Nasal Cannula 2.0 


 


9/5/17 08:35 98.9       











Exam


Constitutional:  alert, oriented, other (Primarily [place]er speaking)


Respiratory:  clear to auscultation, normal air movement


Cardiovascular:  nl pulses, regular rate and rhythm


Gastrointestinal:  soft, tender (Diffuse intermittent TTP, primarily upper 

abdomen/right upper quadrant)


Musculoskeletal:  nl extremities to inspection


Extremities:  normal pulses, other (No edema, clubbing or cyanosis)


Neurological:  CNS II-XII intact, lethargic, nl mental status, nl speech, other 

(Generalized weakness)





Labs


Result Diagram:  


9/5/17 0733                                                                    

            9/5/17 0733








Medications


Medications





 Current Medications


Fluconazole/ Sodium Chloride (Diflucan 100 Mg/ NS (Pmx)) 50 ml @ 50 mls/hr Q24H 

IVPB  Last administered on 9/5/17at 08:25; Admin Dose 50 MLS/HR;  Start 9/5/17 

at 07:30


Aspirin (Halfprin) 81 mg DAILY PO ;  Start 9/5/17 at 09:00


Digoxin (Digoxin) 0.125 mg ONCE PO ;  Start 9/5/17 at 08:42;  Stop 9/5/17 at 10:

00


Ferrous Sulfate (Ferrous Sulfate (Ec)) 325 mg DAILY PO ;  Start 9/5/17 at 09:00


Magnesium Oxide (Mag-Ox 400) 400 mg DAILY PO ;  Start 9/5/17 at 09:00


Metoclopramide HCl (Reglan) 10 mg Q6H  PRN PO NAUSEA AND/OR VOMITING;  Start 9/5 /17 at 07:30


Multivitamins/ Minerals (Theragran-M) 1 tab DAILY PO ;  Start 9/5/17 at 09:00


Pantoprazole (Protonix Tab) 40 mg DAILY PO ;  Start 9/6/17 at 07:00


Sertraline HCl (Zoloft) 25 mg QHS PO ;  Start 9/5/17 at 21:00











SHAYY DAVIS Sep 5, 2017 10:09

## 2017-09-06 VITALS — SYSTOLIC BLOOD PRESSURE: 144 MMHG | DIASTOLIC BLOOD PRESSURE: 96 MMHG | RESPIRATION RATE: 23 BRPM | HEART RATE: 75 BPM

## 2017-09-06 VITALS — RESPIRATION RATE: 23 BRPM | DIASTOLIC BLOOD PRESSURE: 71 MMHG | SYSTOLIC BLOOD PRESSURE: 119 MMHG | HEART RATE: 69 BPM

## 2017-09-06 VITALS — HEART RATE: 105 BPM | RESPIRATION RATE: 18 BRPM | SYSTOLIC BLOOD PRESSURE: 133 MMHG | DIASTOLIC BLOOD PRESSURE: 76 MMHG

## 2017-09-06 VITALS — RESPIRATION RATE: 24 BRPM | HEART RATE: 78 BPM | DIASTOLIC BLOOD PRESSURE: 83 MMHG | SYSTOLIC BLOOD PRESSURE: 121 MMHG

## 2017-09-06 VITALS — RESPIRATION RATE: 22 BRPM | SYSTOLIC BLOOD PRESSURE: 114 MMHG | DIASTOLIC BLOOD PRESSURE: 79 MMHG | HEART RATE: 79 BPM

## 2017-09-06 VITALS — SYSTOLIC BLOOD PRESSURE: 106 MMHG | HEART RATE: 72 BPM | RESPIRATION RATE: 12 BRPM | DIASTOLIC BLOOD PRESSURE: 59 MMHG

## 2017-09-06 VITALS — DIASTOLIC BLOOD PRESSURE: 68 MMHG | SYSTOLIC BLOOD PRESSURE: 123 MMHG | RESPIRATION RATE: 22 BRPM | HEART RATE: 80 BPM

## 2017-09-06 VITALS — SYSTOLIC BLOOD PRESSURE: 138 MMHG | DIASTOLIC BLOOD PRESSURE: 92 MMHG | RESPIRATION RATE: 25 BRPM | HEART RATE: 101 BPM

## 2017-09-06 VITALS — SYSTOLIC BLOOD PRESSURE: 141 MMHG | HEART RATE: 79 BPM | DIASTOLIC BLOOD PRESSURE: 78 MMHG | RESPIRATION RATE: 19 BRPM

## 2017-09-06 VITALS — DIASTOLIC BLOOD PRESSURE: 83 MMHG | SYSTOLIC BLOOD PRESSURE: 124 MMHG | HEART RATE: 59 BPM | RESPIRATION RATE: 22 BRPM

## 2017-09-06 VITALS — SYSTOLIC BLOOD PRESSURE: 138 MMHG | RESPIRATION RATE: 20 BRPM | HEART RATE: 82 BPM | DIASTOLIC BLOOD PRESSURE: 94 MMHG

## 2017-09-06 VITALS — SYSTOLIC BLOOD PRESSURE: 136 MMHG | DIASTOLIC BLOOD PRESSURE: 91 MMHG | HEART RATE: 83 BPM | RESPIRATION RATE: 21 BRPM

## 2017-09-06 VITALS — SYSTOLIC BLOOD PRESSURE: 152 MMHG | HEART RATE: 91 BPM | RESPIRATION RATE: 21 BRPM | DIASTOLIC BLOOD PRESSURE: 89 MMHG

## 2017-09-06 VITALS — RESPIRATION RATE: 22 BRPM | HEART RATE: 76 BPM | SYSTOLIC BLOOD PRESSURE: 140 MMHG | DIASTOLIC BLOOD PRESSURE: 83 MMHG

## 2017-09-06 VITALS — SYSTOLIC BLOOD PRESSURE: 95 MMHG | RESPIRATION RATE: 18 BRPM | HEART RATE: 78 BPM | DIASTOLIC BLOOD PRESSURE: 65 MMHG

## 2017-09-06 VITALS — DIASTOLIC BLOOD PRESSURE: 96 MMHG | SYSTOLIC BLOOD PRESSURE: 147 MMHG | RESPIRATION RATE: 18 BRPM | HEART RATE: 93 BPM

## 2017-09-06 VITALS — HEART RATE: 81 BPM | RESPIRATION RATE: 22 BRPM | SYSTOLIC BLOOD PRESSURE: 130 MMHG | DIASTOLIC BLOOD PRESSURE: 83 MMHG

## 2017-09-06 VITALS — HEART RATE: 74 BPM | RESPIRATION RATE: 20 BRPM | SYSTOLIC BLOOD PRESSURE: 135 MMHG | DIASTOLIC BLOOD PRESSURE: 88 MMHG

## 2017-09-06 VITALS — DIASTOLIC BLOOD PRESSURE: 96 MMHG | RESPIRATION RATE: 22 BRPM | SYSTOLIC BLOOD PRESSURE: 138 MMHG | HEART RATE: 99 BPM

## 2017-09-06 VITALS — DIASTOLIC BLOOD PRESSURE: 95 MMHG | RESPIRATION RATE: 21 BRPM | HEART RATE: 87 BPM | SYSTOLIC BLOOD PRESSURE: 138 MMHG

## 2017-09-06 VITALS — SYSTOLIC BLOOD PRESSURE: 134 MMHG | HEART RATE: 96 BPM | RESPIRATION RATE: 23 BRPM | DIASTOLIC BLOOD PRESSURE: 95 MMHG

## 2017-09-06 VITALS — HEART RATE: 52 BPM | SYSTOLIC BLOOD PRESSURE: 112 MMHG | RESPIRATION RATE: 21 BRPM | DIASTOLIC BLOOD PRESSURE: 69 MMHG

## 2017-09-06 VITALS — HEART RATE: 115 BPM | RESPIRATION RATE: 25 BRPM | SYSTOLIC BLOOD PRESSURE: 120 MMHG | DIASTOLIC BLOOD PRESSURE: 95 MMHG

## 2017-09-06 VITALS — DIASTOLIC BLOOD PRESSURE: 106 MMHG | RESPIRATION RATE: 19 BRPM | HEART RATE: 97 BPM | SYSTOLIC BLOOD PRESSURE: 146 MMHG

## 2017-09-06 VITALS — DIASTOLIC BLOOD PRESSURE: 81 MMHG | HEART RATE: 79 BPM | RESPIRATION RATE: 20 BRPM | SYSTOLIC BLOOD PRESSURE: 135 MMHG

## 2017-09-06 VITALS — RESPIRATION RATE: 19 BRPM | HEART RATE: 90 BPM | DIASTOLIC BLOOD PRESSURE: 81 MMHG | SYSTOLIC BLOOD PRESSURE: 130 MMHG

## 2017-09-06 VITALS — RESPIRATION RATE: 18 BRPM | DIASTOLIC BLOOD PRESSURE: 66 MMHG | SYSTOLIC BLOOD PRESSURE: 99 MMHG | HEART RATE: 77 BPM

## 2017-09-06 VITALS — DIASTOLIC BLOOD PRESSURE: 75 MMHG | RESPIRATION RATE: 22 BRPM | HEART RATE: 79 BPM | SYSTOLIC BLOOD PRESSURE: 116 MMHG

## 2017-09-06 VITALS — HEART RATE: 77 BPM | RESPIRATION RATE: 22 BRPM | DIASTOLIC BLOOD PRESSURE: 77 MMHG | SYSTOLIC BLOOD PRESSURE: 119 MMHG

## 2017-09-06 VITALS — DIASTOLIC BLOOD PRESSURE: 69 MMHG | SYSTOLIC BLOOD PRESSURE: 115 MMHG | HEART RATE: 67 BPM | RESPIRATION RATE: 23 BRPM

## 2017-09-06 VITALS — DIASTOLIC BLOOD PRESSURE: 67 MMHG | HEART RATE: 74 BPM | SYSTOLIC BLOOD PRESSURE: 118 MMHG | RESPIRATION RATE: 22 BRPM

## 2017-09-06 VITALS — DIASTOLIC BLOOD PRESSURE: 90 MMHG | SYSTOLIC BLOOD PRESSURE: 150 MMHG | RESPIRATION RATE: 21 BRPM | HEART RATE: 94 BPM

## 2017-09-06 VITALS — DIASTOLIC BLOOD PRESSURE: 99 MMHG | SYSTOLIC BLOOD PRESSURE: 145 MMHG | HEART RATE: 88 BPM | RESPIRATION RATE: 20 BRPM

## 2017-09-06 LAB
ABNORMAL IP MESSAGE: 1
ALBUMIN SERPL-MCNC: 2.8 G/DL (ref 3.3–4.9)
ALBUMIN/GLOB SERPL: 0.87 {RATIO}
ALP SERPL-CCNC: 175 IU/L (ref 42–121)
ALT SERPL-CCNC: 192 IU/L (ref 13–69)
ANION GAP SERPL CALC-SCNC: 15 MMOL/L (ref 8–16)
AST SERPL-CCNC: 79 IU/L (ref 15–46)
BASOPHILS # BLD AUTO: 0 10^3/UL (ref 0–0.1)
BASOPHILS NFR BLD: 0 % (ref 0–2)
BILIRUB DIRECT SERPL-MCNC: 0 MG/DL (ref 0–0.2)
BILIRUB SERPL-MCNC: 0.5 MG/DL (ref 0.2–1.3)
BUN SERPL-MCNC: 31 MG/DL (ref 7–20)
CALCIUM SERPL-MCNC: 7.9 MG/DL (ref 8.4–10.2)
CHLORIDE SERPL-SCNC: 110 MMOL/L (ref 97–110)
CK MB SERPL-MCNC: 1.79 NG/ML (ref 0–2.4)
CK SERPL-CCNC: 37 IU/L (ref 23–200)
CO2 SERPL-SCNC: 22 MMOL/L (ref 21–31)
CREAT SERPL-MCNC: 1.14 MG/DL (ref 0.44–1)
EOSINOPHIL # BLD: 0 10^3/UL (ref 0–0.5)
EOSINOPHIL NFR BLD: 0 % (ref 0–7)
ERYTHROCYTE [DISTWIDTH] IN BLOOD BY AUTOMATED COUNT: 14.9 % (ref 11.5–14.5)
GLOBULIN SER-MCNC: 3.2 G/DL (ref 1.3–3.2)
GLUCOSE SERPL-MCNC: 125 MG/DL (ref 70–220)
HCT VFR BLD CALC: 28.5 % (ref 37–47)
HGB BLD-MCNC: 9.2 G/DL (ref 12–16)
LYMPHOCYTES # BLD AUTO: 0.7 10^3/UL (ref 0.8–2.9)
LYMPHOCYTES NFR BLD AUTO: 8.8 % (ref 15–51)
MAGNESIUM SERPL-MCNC: 1.8 MG/DL (ref 1.7–2.5)
MCH RBC QN AUTO: 27.7 PG (ref 29–33)
MCHC RBC AUTO-ENTMCNC: 32.3 G/DL (ref 32–37)
MCV RBC AUTO: 85.8 FL (ref 82–101)
MONOCYTES # BLD: 0.5 10^3/UL (ref 0.3–0.9)
MONOCYTES NFR BLD: 6.4 % (ref 0–11)
NEUTROPHILS NFR BLD AUTO: 83.8 % (ref 39–77)
NRBC # BLD MANUAL: 0 10^3/UL (ref 0–0)
NRBC BLD AUTO-RTO: 0 /100WBC (ref 0–0)
PHOSPHATE SERPL-MCNC: 3.5 MG/DL (ref 2.5–4.9)
PLATELET # BLD: 92 10^3/UL (ref 140–415)
PMV BLD AUTO: 10.1 FL (ref 7.4–10.4)
POSITIVE DIFF: (no result)
POTASSIUM SERPL-SCNC: 3.8 MMOL/L (ref 3.5–5.1)
PROT SERPL-MCNC: 6 G/DL (ref 6.1–8.1)
RBC # BLD AUTO: 3.32 10^6/UL (ref 4.2–5.4)
SODIUM SERPL-SCNC: 143 MMOL/L (ref 135–144)
TROPONIN I SERPL-MCNC: 0.1 NG/ML (ref 0–0.12)
WBC # BLD AUTO: 7.7 10^3/UL (ref 4.8–10.8)

## 2017-09-06 PROCEDURE — 0F798DZ DILATION OF COMMON BILE DUCT WITH INTRALUMINAL DEVICE, VIA NATURAL OR ARTIFICIAL OPENING ENDOSCOPIC: ICD-10-PCS | Performed by: INTERNAL MEDICINE

## 2017-09-06 RX ADMIN — MULTIPLE VITAMINS W/ MINERALS TAB SCH TAB: TAB at 08:26

## 2017-09-06 RX ADMIN — FOLIC ACID SCH MLS/HR: 5 INJECTION, SOLUTION INTRAMUSCULAR; INTRAVENOUS; SUBCUTANEOUS at 10:23

## 2017-09-06 RX ADMIN — MORPHINE SULFATE PRN MG: 2 INJECTION, SOLUTION INTRAMUSCULAR; INTRAVENOUS at 20:52

## 2017-09-06 RX ADMIN — METOPROLOL TARTRATE SCH MG: 25 TABLET, FILM COATED ORAL at 20:57

## 2017-09-06 RX ADMIN — FLUCONAZOLE, SODIUM CHLORIDE SCH MLS/HR: 2 INJECTION INTRAVENOUS at 08:42

## 2017-09-06 RX ADMIN — ASPIRIN SCH MG: 81 TABLET, COATED ORAL at 08:26

## 2017-09-06 RX ADMIN — FOLIC ACID SCH MLS/HR: 5 INJECTION, SOLUTION INTRAMUSCULAR; INTRAVENOUS; SUBCUTANEOUS at 23:30

## 2017-09-06 RX ADMIN — SERTRALINE HYDROCHLORIDE SCH MG: 50 TABLET ORAL at 20:57

## 2017-09-06 RX ADMIN — MEROPENEM SCH MLS/HR: 1 INJECTION, POWDER, FOR SOLUTION INTRAVENOUS at 08:42

## 2017-09-06 RX ADMIN — FOLIC ACID SCH MLS/HR: 5 INJECTION, SOLUTION INTRAMUSCULAR; INTRAVENOUS; SUBCUTANEOUS at 19:00

## 2017-09-06 RX ADMIN — MORPHINE SULFATE PRN MG: 2 INJECTION, SOLUTION INTRAMUSCULAR; INTRAVENOUS at 14:10

## 2017-09-06 RX ADMIN — MEROPENEM SCH MLS/HR: 1 INJECTION, POWDER, FOR SOLUTION INTRAVENOUS at 20:53

## 2017-09-06 RX ADMIN — MAGNESIUM OXIDE TAB 400 MG (241.3 MG ELEMENTAL MG) SCH MG: 400 (241.3 MG) TAB at 08:26

## 2017-09-06 RX ADMIN — FERROUS SULFATE TAB 325 MG (65 MG ELEMENTAL FE) SCH MG: 325 (65 FE) TAB at 08:26

## 2017-09-06 NOTE — OPR
Date/Time of Note


Date/Time of Note


DATE: 9/6/17 


TIME: 13:17





Operative Report


Procedure Date:  Sep 6, 2017


Preoperative Diagnosis


cholangitis 


cbd stones


Postoperative Diagnosis





cbd stones large


cbd stent placed


Operation Performed


ercp


cbd stones noted 10/7 cbd stent placed


Surgeon:  GARY ALEGRE MD


Anesthesia Type:  general


Anesthesiologist:  ALICIA MEYER MD


Estimated Blood Loss:  none


Transfusion Required:  no


Specimen:  none


Specimens


none


Grafts/Implants:  none


Grafts/Implants


none


Tubes/Drains


none


Complications:  no


Pt Condition Post Procedure:  stable


Indications


cbd stones with cholangitis


Operative\Procedure Findings


ercp performed 


cbd dilated  large cbd stones noted


sphincterotomy not performed [pt not very stable]


10/7 cbd stent placed











GARY ALEGRE MD Sep 6, 2017 13:23

## 2017-09-06 NOTE — GILP
DATE OF PROCEDURE:  09/06/2017

 

PROCEDURE PERFORMED:  Endoscopic retrograde

cholangiopancreatography.

 

PREOPERATIVE DIAGNOSIS:  The patient presenting with history of

cholangitis and hypotension with a history of having

choledocholithiasis on the CT scan of the abdomen.  Procedure at

this time is performed to create access for biliary drainage.

The patient sustained a small myocardial infarction, hence only

palliative stent placement has been planned.

 

POSTOPERATIVE DIAGNOSIS:  At least 17-18 mm common bile duct

stone was noted.  The bile duct was found to be dilated.  Small

stones were noted in the common bile duct.  Because the patient

had a small heart attack at this time, I elected to put a stent

in and because of that, I put a common bile duct stent and then

the procedure was terminated.

 

DESCRIPTION OF PROCEDURE:  After informed written consent is

obtained, the patient was intubated by anesthesiologist, Dr. Dill.  While the patient was in prone position, the Olympus

video side-viewing duodenoscope was inserted into the oropharynx

and then into the esophagus and then into the stomach and then

into the duodenum.  There was evidence of ampulla located in the

second part of the duodenum.  There was a diverticula noted, one

on each side of the ampulla.  At this time, cannulation was

performed with the help of a Dreamtome.  Contrast was injected.

There was a large filling defect noted in the common bile duct

and a small filling defect noted also.  At this time, the

Dreamtome was removed and over the guidewire, a 10 x 7 [place]-

type of CBD stent was placed into the common bile duct across the

ampulla into the duodenum and photographs were obtained and the

procedure was terminated.

 

PLAN:  Recommend continue antibiotic therapy.  Follow the patient

closely.

 

 

 

Dictated By:  Bon Strong MD

 

/gratn/kristi

Job#:  [id number]/Document#:  [id number]

D:  09/06/2017 13:55

T:  09/06/2017 14:48

CC:  Christophe Velazquez MD;*[facility]*

## 2017-09-06 NOTE — PN
Date/Time of Note


Date/Time of Note


DATE: 9/6/17 


TIME: 09:12





Assessment/Plan


VTE Prophylaxis


VTE Prophylaxis Intervention:  SCD's





Lines/Catheters


IV Catheter Type (from Nrs):  Peripheral IV


Urinary Cath still in place:  Yes


Reason Cath still needed:  other (indicate) (Monitor urine output, acute kidney 

injury)





Assessment/Plan


Assessment/Plan


81 years old female with:





1.  Choledocholithiasis, likely ascending cholangitis with sepsis, Dr Strong 

has been consulted.  LFTs improved today but patient still with ongoing pain.


Continue IV antibiotics and plan for ERCP today as patient more stable and if 

okay with cardiology.


Blood cultures no growth to date.





2.  Coronary artery disease, status post bypass surgery and aortic valve 

replacement.  


Troponin trended back down, slight elevation likely secondary to sepsis and 

demand ischemia.  


Pressure much improved, will resume beta blockers if okay with cardiology.





3.  Chronic atrial fibrillation, currently in sinus rhythm, continue digoxin, 

rate controlled


Digoxin level subtherapeutic, continue digoxin.


Hold aspirin today for procedure.  


Resume beta-blockers postprocedure today.





4.  Diabetes mellitus: Sliding scale insulin, A1c 6.0.





5.  Hypertension: Resolved hypotension, off pressors for 12 hours now.  Blood 

pressure stable, will resume beta-blockers postprocedure today if remains 

stable.  





Prophylaxis: Protonix for GI prophylaxis, SCDs for DVT prophylaxis


Disposition: ERCP today if okay with cardiology, continue IV antibiotics.





Subjective


24 Hr Interval Summary


Free Text/Dictation


Patient more stable this morning, off Levophed, vital signs stable.  Status 

stable.  Troponin slightly elevated likely secondary to demand ischemia, 

cardiology evaluation ongoing.  Plan for ERCP today at 12 PM today if okay with 

cardiology.


Appreciate recommendations from gastroenterology.





Exam/Review of Systems


Vital Signs


Vitals





 Vital Signs








  Date Time  Temp Pulse Resp B/P Pulse Ox O2 Delivery O2 Flow Rate FiO2


 


9/6/17 08:00  74      


 


9/6/17 06:00   18 147/96 98 Room Air  


 


9/6/17 04:00 98.4       


 


9/5/17 13:00       2.0 














 Intake and Output   


 


 9/5/17 9/5/17 9/6/17





 14:59 22:59 06:59


 


Intake Total 413.125 ml 703.125 ml 1213 ml


 


Output Total 220 ml 215 ml 305 ml


 


Balance 193.125 ml 488.125 ml 908 ml











Exam


Constitutional:  alert, oriented, well developed


Respiratory:  clear to auscultation, normal air movement


Cardiovascular:  irregular rhythm (chronic A fib )


Gastrointestinal:  soft, tender (Intermittent epigastric pain to right upper 

quadrant and radiating to the back)


Musculoskeletal:  nl extremities to inspection


Extremities:  normal pulses, other (No edema, clubbing or cyanosis)


Neurological:  CNS II-XII intact, lethargic, nl mental status, nl speech, other 

(Generalized weakness)





Results


Result Diagram:  


9/6/17 0420                                                                    

            9/6/17 0420





Results 24 hrs





Laboratory Tests








Test


  9/5/17


10:19 9/5/17


13:36 9/5/17


16:20 9/5/17


17:29


 


Prothrombin Time 17.4  H   


 


Prothrombin Time Ratio 1.4     


 


INR International Normalized


Ratio 1.42  


  


  


  


 


 


Activated Partial


Thromboplast Time 35.5  H


  


  


  


 


 


Hemoglobin A1c 6.0  H   


 


Creatine Kinase 34    39   


 


Creatine Kinase Index 4.3    4.3   


 


Creatinine Kinase MB (Mass) 1.46    1.66   


 


Troponin I 0.221  *H  0.134  *H 


 


Digoxin Level < 0.4  L   


 


Bedside Glucose  215    202  














Test


  9/5/17


21:15 9/5/17


21:55 9/6/17


00:43 9/6/17


04:20


 


Bedside Glucose 141    128   


 


Creatine Kinase  37    37  


 


Creatine Kinase Index  4.7    4.8  


 


Creatinine Kinase MB (Mass)  1.74    1.79  


 


Troponin I  0.115    0.105  


 


White Blood Count    7.7  #


 


Red Blood Count    3.32  L


 


Hemoglobin    9.2  L


 


Hematocrit    28.5  L


 


Mean Corpuscular Volume    85.8  


 


Mean Corpuscular Hemoglobin    27.7  L


 


Mean Corpuscular Hemoglobin


Concent 


  


  


  32.3  


 


 


Red Cell Distribution Width    14.9  H


 


Platelet Count    92  L


 


Mean Platelet Volume    10.1  


 


Neutrophils %    83.8  H


 


Lymphocytes %    8.8  L


 


Monocytes %    6.4  


 


Eosinophils %    0.0  


 


Basophils %    0.0  


 


Nucleated Red Blood Cells %    0.0  


 


Neutrophils # (Manual)    6.4  


 


Lymphocytes #    0.7  L


 


Monocytes #    0.5  


 


Eosinophils #    0.0  


 


Basophils #    0.0  


 


Nucleated Red Blood Cells #    0.0  


 


Sodium Level    143  


 


Potassium Level    3.8  


 


Chloride Level    110  


 


Carbon Dioxide Level    22  


 


Anion Gap    15  


 


Blood Urea Nitrogen    31  H


 


Creatinine    1.14  H


 


Glucose Level    125  


 


Calcium Level    7.9  L


 


Phosphorus Level    3.5  


 


Magnesium Level    1.8  


 


Total Bilirubin    0.5  


 


Direct Bilirubin    0.00  #


 


Indirect Bilirubin    0.5  


 


Aspartate Amino Transf


(AST/SGOT) 


  


  


  79  H


 


 


Alanine Aminotransferase


(ALT/SGPT) 


  


  


  192  H


 


 


Alkaline Phosphatase    175  H


 


Total Protein    6.0  #L


 


Albumin    2.8  #L


 


Globulin    3.20  


 


Albumin/Globulin Ratio    0.87  














Test


  9/6/17


04:25 9/6/17


05:13 9/6/17


08:45 


 


 


Bedside Glucose 87    118   


 


Lab Scanned Report


  


  BLOOD


TRANSFUSION 


  


 











Medications


Medications





 Current Medications


Fluconazole/ Sodium Chloride (Diflucan 100 Mg/ NS (Pmx)) 50 ml @ 50 mls/hr Q24H 

IVPB  Last administered on 9/6/17at 08:42; Admin Dose 50 MLS/HR;  Start 9/5/17 

at 07:30


Aspirin (Halfprin) 81 mg DAILY PO  Last administered on 9/5/17at 10:14; Admin 

Dose 81 MG;  Start 9/5/17 at 09:00


Ferrous Sulfate (Ferrous Sulfate (Ec)) 325 mg DAILY PO  Last administered on 9/5 /17at 10:14; Admin Dose 325 MG;  Start 9/5/17 at 09:00


Magnesium Oxide (Mag-Ox 400) 400 mg DAILY PO  Last administered on 9/5/17at 10:

14; Admin Dose 400 MG;  Start 9/5/17 at 09:00


Metoclopramide HCl (Reglan) 10 mg Q6H  PRN PO NAUSEA AND/OR VOMITING;  Start 9/5 /17 at 07:30


Multivitamins/ Minerals (Theragran-M) 1 tab DAILY PO  Last administered on 9/5/ 17at 10:14; Admin Dose 1 TAB;  Start 9/5/17 at 09:00


Sertraline HCl 25 mg 25 mg QHS PO  Last administered on 9/5/17at 21:17; Admin 

Dose 25 MG;  Start 9/5/17 at 21:00


Sodium Chloride 1,000 ml @  80 mls/hr [id number] IV  Last administered on 9/5/17at 

22:30; Admin Dose 80 MLS/HR;  Start 9/5/17 at 10:00


Meropenem/Sodium Chloride (Merrem 1 Gm/50 ml (Pmx)) 50 ml @  100 mls/hr Q12 

IVPB  Last administered on 9/6/17at 08:42; Admin Dose 100 MLS/HR;  Start 9/5/17 

at 11:00


Insulin Aspart (Novolog Insulin Pen) NOVOLOG *MILD* ALGORI... Q4 SC  Last 

administered on 9/5/17at 21:24; Admin Dose 1 UNIT;  Start 9/5/17 at 13:00


Digoxin (Digoxin) 0.125 mg Q2D@13 PO ;  Start 9/7/17 at 13:00


Ondansetron HCl (Zofran Inj) 4 mg Q6H  PRN IV NAUSEA AND/OR VOMITING;  Start 9/5 /17 at 10:30


Nitroglycerin (Nitroglycerin (Sl Tab) 0.4 Mg) 1 tab Q5M  PRN SL CHEST PAIN;  

Start 9/5/17 at 10:30


Acetaminophen (Tylenol Tab) 650 mg Q6H  PRN PO PAIN LEVEL 1-3 OR FEVER Last 

administered on 9/6/17at 01:49; Admin Dose 650 MG;  Start 9/5/17 at 10:30


Acetaminophen (Tylenol Supp) 650 mg Q6H  PRN AR PAIN LEVEL 1-3 OR FEVER;  Start 

9/5/17 at 10:30


Morphine Sulfate (morphine) 2 mg Q4H  PRN IV PAIN LEVEL 7-10;  Start 9/5/17 at 

10:30


Docusate Sodium (Colace) 100 mg Q12H  PRN PO CONSTIPATION;  Start 9/5/17 at 10:

30


Magnesium Hydroxide (Milk Of Mag) 30 ml DAILY  PRN PO CONSTIPATION;  Start 9/5/ 17 at 10:30


Bisacodyl (Dulcolax Supp) 10 mg DAILY  PRN AR CONSTIPATION;  Start 9/5/17 at 10:

30


Pantoprazole (Protonix Iv) 40 mg DAILY@06 IV  Last administered on 9/6/17at 05:

55; Admin Dose 40 MG;  Start 9/6/17 at 06:00


Miscellaneous Information 1 ea NOTE XX ;  Start 9/5/17 at 10:30


Glucose (Glutose) 15 gm Q15M  PRN PO DECREASED GLUCOSE;  Start 9/5/17 at 10:30


Glucose (Glutose) 22.5 gm Q15M  PRN PO DECREASED GLUCOSE;  Start 9/5/17 at 10:30


Dextrose (D50w Syringe) 25 ml Q15M  PRN IV DECREASED GLUCOSE;  Start 9/5/17 at 

10:30


Dextrose (D50w Syringe) 50 ml Q15M  PRN IV DECREASED GLUCOSE;  Start 9/5/17 at 

10:30


Glucagon (Glucagen) 1 mg Q15M  PRN IM DECREASED GLUCOSE;  Start 9/5/17 at 10:30


Glucose (Glutose) 15 gm Q15M  PRN BUCCAL DECREASED GLUCOSE;  Start 9/5/17 at 10:

30











SHAYY DAVIS Sep 6, 2017 09:24

## 2017-09-06 NOTE — RADRPT
Vent Rate: 73 bpm

RR Interval: 0 msec

NH Interval: 0 msec

QRS Duration: 66 msec

QT Interval: 444 msec

QTC Interval: 489 msec

P-R-T Axis: 0 - 9 - 52 degrees

 

 Atrial fibrillation with premature ventricular or aberrantly conducted 

complexes

 Low voltage QRS

 Cannot rule out Anterior infarct , age undetermined

 Abnormal ECG

 

Electronically Signed By: Eugene Stallworth

[id number]

## 2017-09-06 NOTE — HPN
Date/Time of Note


Date/Time of Note


DATE: 9/6/17 


TIME: 13:16





Interval H&P Admission Note


Pt. seen H&P reviewed:  No system changes











GARY ALEGRE MD Sep 6, 2017 13:17

## 2017-09-06 NOTE — CONS
Date/Time of Note


Date/Time of Note


DATE: 9/6/17 


TIME: 09:12





Assessment/Plan


Assessment/Plan


Chief Complaint/Hosp Course


Impression:





Preop CV Exam: given urgency of procedure which is endoscopic and low risk, ok 

to proceed without further cardiac workup. pt does have elevated cardiac risk, 

likely intermediate to high risk given cad/cabg hx, small trop leak (though 

likely from sepsis demand ischemia). she has poor functional status so 

difficult to assess for baseline symptoms, normal LVEF on 2/2017 echo, but will 

need to repeat if possible preop to make sure no large drop in lv function.  

will need further cardiac testing when acute issues resolve.





Elevated trop: likely type ii given hypotension sepsis in setting of known cad 

hx s/p cabg.  pt without current cp and trop was low level and down trending 

obtain echo, and start asa/statin when safe.  will likely benefit from dapt 

therapy as well long term.  may need stress vs. invasive testing when acute 

issues resolve





CAD: as above





s/p AVR- normal fxn on previous echo





AF- rate control with amio, dig if hypotensive. otherwise with bb.  not on 

anticoag given h/o falls.


Problems:  





Consultation Date/Type/Reason


Admit Date/Time


Sep 5, 2017 at 04:35


Date of Consultation:  Sep 6, 2017


Type of Consultation:  Cardiology


Reason for Consultation


Preop, Elevated Trop


Referring Provider:  SHAYY DAVIS





Hx of Present Illness


81 y.o. with h/o of CABG, AVR 2005, chronic afib not on anticoag due to h/o 

falls, and poor functional status.





pt presented to [facility] with a few days of chest pain, L arm pain, and abd pain 

with fatigue, lethargy. pt found to be septic with elevated transaminase levels 

and bili c/w choledocholithiasis. pt was on levophed started on abx and now off 

pressors, stable bp.  pt scheudled for ercp today, cardiology consulted for 

preop evaluation. pt states she has no cp at this time.  hx obtained with 

 and via phone from her daughter.  pt reports sob and mild pain with 

deep breaths.  no pnd, orhtopnea, edema. no palpitations, dizziness.  abd pain 

with palpation, but no current pain otherwise.  pt has very poor fxn status, 

walks only a few feet with PT at home. otherwise in bed needs help with 

transfer.  





\ekg reviewed: afib with LBBB on presentation, now afib rate controlled.


Subjective hx not possible:  other (Ma[place] speaking)


Constitutional:  improved


Eyes:  no complaints


ENT:  no complaints


Respiratory:  no complaints


Cardiovascular:  chest pain


Gastrointestinal:  decreased appetite, pain (Her abdomen, intermittent), 

vomiting


Genitourinary:  no complaints


Musculoskeletal:  no complaints


Skin:  no complaints


Neurologic:  no complaints


Psychological:  nl mood/affect


Immunologic:  no complaints





Past Medical History


  Aneurysm of ascending aorta (441.2) (I71.2)





  History of Aortic Valve Replacement- 2005-Bioprosthetic aortic valve 

replacement (unknown details).


  Atrial flutter (427.32) (I48.92)


  History of CABG (CABG) 2005; s/p mi


  Chronic atrial fibrillation (427.31) (I48.2) No AC due to frequent falls





  Deep vein thrombosis of lower extremity (453.40) (I82.409)- h/o proximal 

right superficial femoral vein; not on coumadin due to GIB


  Diabetes mellitus (250.00) (E11.9)


  Dyslipidemia (272.4) (E78.5)


  History of fall (V15.88) (Z91.81)


  Hypertension (401.9) (I10)


  CKD





Past Surgical History


  History of Aortic Valve Replacement


  2005-Bioprosthetic aortic valve replacement (unknown details).


    TTE 12/2014 - 19/10 gradient; 1.1cm2


  History of CABG (CABG)


  2005; s/p mi


  History of Cholecystectomy


  History of Hip Surgery


  ORIF of subtrochanteric fracture 12/9/12


  History of Percutaneous Placement Of Gastrostomy Tube


  Placed 1/4/13; Removed 6/6/13





Family History


Significant Family History:  other (no sig cad)





Social History


Alcohol Use:  none


Smoking Status:  Never smoker


Drug Use:  none





Exam/Review of Systems


Vital Signs


Vitals





 Vital Signs








  Date Time  Temp Pulse Resp B/P Pulse Ox O2 Delivery O2 Flow Rate FiO2


 


9/6/17 08:00  74      


 


9/6/17 06:00   18 147/96 98 Room Air  


 


9/6/17 04:00 98.4       


 


9/5/17 13:00       2.0 














 Intake and Output   


 


 9/5/17 9/5/17 9/6/17





 14:59 22:59 06:59


 


Intake Total 413.125 ml 703.125 ml 1213 ml


 


Output Total 220 ml 215 ml 305 ml


 


Balance 193.125 ml 488.125 ml 908 ml











Exam


Constitutional:  alert, oriented


Psych:  nl mood/affect, no complaints


Head:  atraumatic, normocephalic


Eyes:  nl conjunctiva


ENMT:  nl external ears & nose, nl lips & teeth


Neck:  non-tender, supple, 


   No jvd


Respiratory:  other (carckles in bases)


Cardiovascular:  irregular rhythm, nl pulses, systolic murmur, 


   No bruits, No edema, No gallop, No jugular venous distention (JVD), No rub


Gastrointestinal:  distended, soft, tender


Musculoskeletal:  nl extremities to inspection


Extremities:  normal pulses


Neurological:  CNS II-XII intact, nl mental status, nl speech


Skin:  nl turgor





Results


Result Diagram:  


9/6/17 0420 9/6/17 0420





Results 24 hrs





Laboratory Tests








Test


  9/5/17


10:19 9/5/17


13:36 9/5/17


16:20 9/5/17


17:29


 


Prothrombin Time 17.4  H   


 


Prothrombin Time Ratio 1.4     


 


INR International Normalized


Ratio 1.42  


  


  


  


 


 


Activated Partial


Thromboplast Time 35.5  H


  


  


  


 


 


Hemoglobin A1c 6.0  H   


 


Creatine Kinase 34    39   


 


Creatine Kinase Index 4.3    4.3   


 


Creatinine Kinase MB (Mass) 1.46    1.66   


 


Troponin I 0.221  *H  0.134  *H 


 


Digoxin Level < 0.4  L   


 


Bedside Glucose  215    202  














Test


  9/5/17


21:15 9/5/17


21:55 9/6/17


00:43 9/6/17


04:20


 


Bedside Glucose 141    128   


 


Creatine Kinase  37    37  


 


Creatine Kinase Index  4.7    4.8  


 


Creatinine Kinase MB (Mass)  1.74    1.79  


 


Troponin I  0.115    0.105  


 


White Blood Count    7.7  #


 


Red Blood Count    3.32  L


 


Hemoglobin    9.2  L


 


Hematocrit    28.5  L


 


Mean Corpuscular Volume    85.8  


 


Mean Corpuscular Hemoglobin    27.7  L


 


Mean Corpuscular Hemoglobin


Concent 


  


  


  32.3  


 


 


Red Cell Distribution Width    14.9  H


 


Platelet Count    92  L


 


Mean Platelet Volume    10.1  


 


Neutrophils %    83.8  H


 


Lymphocytes %    8.8  L


 


Monocytes %    6.4  


 


Eosinophils %    0.0  


 


Basophils %    0.0  


 


Nucleated Red Blood Cells %    0.0  


 


Neutrophils # (Manual)    6.4  


 


Lymphocytes #    0.7  L


 


Monocytes #    0.5  


 


Eosinophils #    0.0  


 


Basophils #    0.0  


 


Nucleated Red Blood Cells #    0.0  


 


Sodium Level    143  


 


Potassium Level    3.8  


 


Chloride Level    110  


 


Carbon Dioxide Level    22  


 


Anion Gap    15  


 


Blood Urea Nitrogen    31  H


 


Creatinine    1.14  H


 


Glucose Level    125  


 


Calcium Level    7.9  L


 


Phosphorus Level    3.5  


 


Magnesium Level    1.8  


 


Total Bilirubin    0.5  


 


Direct Bilirubin    0.00  #


 


Indirect Bilirubin    0.5  


 


Aspartate Amino Transf


(AST/SGOT) 


  


  


  79  H


 


 


Alanine Aminotransferase


(ALT/SGPT) 


  


  


  192  H


 


 


Alkaline Phosphatase    175  H


 


Total Protein    6.0  #L


 


Albumin    2.8  #L


 


Globulin    3.20  


 


Albumin/Globulin Ratio    0.87  














Test


  9/6/17


04:25 9/6/17


05:13 9/6/17


08:45 


 


 


Bedside Glucose 87    118   


 


Lab Scanned Report


  


  BLOOD


TRANSFUSION 


  


 











Medications


Medications





 Current Medications


Fluconazole/ Sodium Chloride (Diflucan 100 Mg/ NS (Pmx)) 50 ml @ 50 mls/hr Q24H 

IVPB  Last administered on 9/6/17at 08:42; Admin Dose 50 MLS/HR;  Start 9/5/17 

at 07:30


Aspirin (Halfprin) 81 mg DAILY PO  Last administered on 9/5/17at 10:14; Admin 

Dose 81 MG;  Start 9/5/17 at 09:00


Ferrous Sulfate (Ferrous Sulfate (Ec)) 325 mg DAILY PO  Last administered on 9/5 /17at 10:14; Admin Dose 325 MG;  Start 9/5/17 at 09:00


Magnesium Oxide (Mag-Ox 400) 400 mg DAILY PO  Last administered on 9/5/17at 10:

14; Admin Dose 400 MG;  Start 9/5/17 at 09:00


Metoclopramide HCl (Reglan) 10 mg Q6H  PRN PO NAUSEA AND/OR VOMITING;  Start 9/5 /17 at 07:30


Multivitamins/ Minerals (Theragran-M) 1 tab DAILY PO  Last administered on 9/5/ 17at 10:14; Admin Dose 1 TAB;  Start 9/5/17 at 09:00


Sertraline HCl 25 mg 25 mg QHS PO  Last administered on 9/5/17at 21:17; Admin 

Dose 25 MG;  Start 9/5/17 at 21:00


Sodium Chloride 1,000 ml @  80 mls/hr [id number] IV  Last administered on 9/5/17at 

22:30; Admin Dose 80 MLS/HR;  Start 9/5/17 at 10:00


Meropenem/Sodium Chloride (Merrem 1 Gm/50 ml (Pmx)) 50 ml @  100 mls/hr Q12 

IVPB  Last administered on 9/6/17at 08:42; Admin Dose 100 MLS/HR;  Start 9/5/17 

at 11:00


Insulin Aspart (Novolog Insulin Pen) NOVOLOG *MILD* ALGORI... Q4 SC  Last 

administered on 9/5/17at 21:24; Admin Dose 1 UNIT;  Start 9/5/17 at 13:00


Digoxin (Digoxin) 0.125 mg Q2D@13 PO ;  Start 9/7/17 at 13:00


Ondansetron HCl (Zofran Inj) 4 mg Q6H  PRN IV NAUSEA AND/OR VOMITING;  Start 9/5 /17 at 10:30


Nitroglycerin (Nitroglycerin (Sl Tab) 0.4 Mg) 1 tab Q5M  PRN SL CHEST PAIN;  

Start 9/5/17 at 10:30


Acetaminophen (Tylenol Tab) 650 mg Q6H  PRN PO PAIN LEVEL 1-3 OR FEVER Last 

administered on 9/6/17at 01:49; Admin Dose 650 MG;  Start 9/5/17 at 10:30


Acetaminophen (Tylenol Supp) 650 mg Q6H  PRN MD PAIN LEVEL 1-3 OR FEVER;  Start 

9/5/17 at 10:30


Morphine Sulfate (morphine) 2 mg Q4H  PRN IV PAIN LEVEL 7-10;  Start 9/5/17 at 

10:30


Docusate Sodium (Colace) 100 mg Q12H  PRN PO CONSTIPATION;  Start 9/5/17 at 10:

30


Magnesium Hydroxide (Milk Of Mag) 30 ml DAILY  PRN PO CONSTIPATION;  Start 9/5/ 17 at 10:30


Bisacodyl (Dulcolax Supp) 10 mg DAILY  PRN MD CONSTIPATION;  Start 9/5/17 at 10:

30


Pantoprazole (Protonix Iv) 40 mg DAILY@06 IV  Last administered on 9/6/17at 05:

55; Admin Dose 40 MG;  Start 9/6/17 at 06:00


Miscellaneous Information 1 ea NOTE XX ;  Start 9/5/17 at 10:30


Glucose (Glutose) 15 gm Q15M  PRN PO DECREASED GLUCOSE;  Start 9/5/17 at 10:30


Glucose (Glutose) 22.5 gm Q15M  PRN PO DECREASED GLUCOSE;  Start 9/5/17 at 10:30


Dextrose (D50w Syringe) 25 ml Q15M  PRN IV DECREASED GLUCOSE;  Start 9/5/17 at 

10:30


Dextrose (D50w Syringe) 50 ml Q15M  PRN IV DECREASED GLUCOSE;  Start 9/5/17 at 

10:30


Glucagon (Glucagen) 1 mg Q15M  PRN IM DECREASED GLUCOSE;  Start 9/5/17 at 10:30


Glucose (Glutose) 15 gm Q15M  PRN BUCCAL DECREASED GLUCOSE;  Start 9/5/17 at 10:

30





Procedures


Procedures


cxr and ct reports reviewed in emr











CEDRICK LUTHER Sep 6, 2017 09:17

## 2017-09-06 NOTE — RADRPT
Echocardiogram Report

 

Patient Name:  STORM ZHANG  Gender:       Female

MRN:           [id number]         Accession #:  [id number]

Birth Date:    1935    Study Date:   06-Sep-2017

Sonographer:   DENISE Leahy RDCS   Location:     [id number]

 

Ref. Physician: SUKHWINDER LUTHER

Quality: Adequate

 

Procedures: Transthoracic echocardiogram with complete 2D, M-Mode, and 

doppler examination.

Indications: NSTEMI. Pre-op.

 

2D/M Mode                          Doppler

Measurement  Value  Normal Ranges  Measurement    Value  Normal Ranges

LVIDd 2D     4.8    3.5 - 5.6 cm   RICHARD Vmax       3.2    cm2

LVIDs 2D     3.0    2.1 - 4.1 cm   RICHARD VTI        3.2    cm2

LVPWd 2D     1.1    0.6 - 1.1 cm   AV Mean Jean    1.3    m/sec

IVSd 2D      1.1    0.6 - 1.1 cm   AV Mean PG     8.2    mmHg

AoR Diam 2D  3.5    2.0 - 3.7 cm   AV Peak Jean    2.1    m/sec

EDV 2D       105.8  cm3            AV Peak PG     10.9   mmHg

ESV 2D       27.4   cm3            AV VTI         39.5   cm

LA Dimen 2D  4.0    2.3 - 4.0 cm   LVOT Mean Jean  1.1    m/sec

LVOT Diam    2.1    cm             LVOT Mean PG   6.5    mmHg

LVOT Peak Jean  1.9    m/sec

LVOT Peak PG   6.9    mmHg

LVOT VTI       37.1   cm

TR Peak Jean    3.5    m/sec

TR Peak PG     50.3   mmHg

RVSP           65.0   mmHg

 

Findings

Left Ventricle: Normal left ventricular systolic function.  Normal left 

ventricular cavity size.  Normal left ventricular wall thickness.  Mild 

concentric left ventricular hypertrophy.  Ejection fraction is visually 

estimated at 60 %.  Tissue Doppler/Mitral Doppler indices are 

indeterminate in this study due to the presence of afib.

Right Ventricle: Normal right ventricular systolic function.  Mild 

enlargement of right ventricle.

Left Atrium: There is mild enlargement of left atrium.

Right Atrium: There is mild enlargement of right atrium.

Mitral Valve: Mitral valve leaflets appear mildly thickened.  Moderate 

mitral annular calcification.  Mild mitral valve regurgitation.

Aortic Valve: Aortic Valve Bio Prosthesis.  Gradients normal for valve 

type and size.  Aortic valve Max velocity 2.13 m/sec.  Max PG 18.00 

mmHg.  Mean PG 8.20 mmHg.  No aortic regurgitation.

Tricuspid Valve: Normal appearance of the tricuspid valve.  Right 

ventricular systolic pressure is consistent with moderate pulmonary 

hypertension.  Estimated peak PA systolic pressure 65 mmHg.  There is 

mild to moderate tricuspid regurgitation.

Pulmonic Valve: Pulmonic valve not well visualized.  No evidence of 

pulmonic regurgitation.

Pericardium: Normal pericardium with no significant pericardial effusion.

Aorta: There is moderate aortic root dilation.

IVC: Dilated IVC without respiratory collapse consistent with elevated 

right atrial pressure.

 

Conclusions

Normal left ventricular systolic function.  Normal left ventricular 

cavity size.  Normal left ventricular wall thickness.  Mild concentric 

left ventricular hypertrophy.  Ejection fraction is visually estimated 

at 60 %.  Tissue Doppler/Mitral Doppler indices are indeterminate in 

this study due to the presence of afib.

Normal right ventricular systolic function.  Mild enlargement of right 

ventricle.

There is mild enlargement of left atrium.

There is mild enlargement of right atrium.

Normal appearance of the tricuspid valve.  Right ventricular systolic 

pressure is consistent with moderate pulmonary hypertension.  Estimated 

peak PA systolic pressure 65 mmHg.  There is mild to moderate tricuspid 

regurgitation.

Dilated IVC without respiratory collapse consistent with elevated right 

atrial pressure.

No Vegetation, masses, or thrombi seen.

 

Electronically Signed By:

Sukhwinder Luther

06-Sep-2017 14:37:19  -0700

 

Patient Name: STORM ZHANG

MRN: [id number]

Study Date: 06-Sep-2017

 

[id number]

## 2017-09-07 VITALS — RESPIRATION RATE: 19 BRPM | DIASTOLIC BLOOD PRESSURE: 96 MMHG | HEART RATE: 74 BPM | SYSTOLIC BLOOD PRESSURE: 128 MMHG

## 2017-09-07 VITALS — SYSTOLIC BLOOD PRESSURE: 137 MMHG | RESPIRATION RATE: 18 BRPM | HEART RATE: 78 BPM | DIASTOLIC BLOOD PRESSURE: 103 MMHG

## 2017-09-07 VITALS — HEART RATE: 85 BPM | RESPIRATION RATE: 20 BRPM | SYSTOLIC BLOOD PRESSURE: 139 MMHG | DIASTOLIC BLOOD PRESSURE: 78 MMHG

## 2017-09-07 VITALS — DIASTOLIC BLOOD PRESSURE: 93 MMHG | SYSTOLIC BLOOD PRESSURE: 148 MMHG | RESPIRATION RATE: 19 BRPM | HEART RATE: 82 BPM

## 2017-09-07 VITALS — SYSTOLIC BLOOD PRESSURE: 136 MMHG | RESPIRATION RATE: 17 BRPM | DIASTOLIC BLOOD PRESSURE: 87 MMHG

## 2017-09-07 VITALS — SYSTOLIC BLOOD PRESSURE: 140 MMHG | RESPIRATION RATE: 21 BRPM | HEART RATE: 68 BPM | DIASTOLIC BLOOD PRESSURE: 97 MMHG

## 2017-09-07 VITALS — SYSTOLIC BLOOD PRESSURE: 131 MMHG | RESPIRATION RATE: 14 BRPM | DIASTOLIC BLOOD PRESSURE: 73 MMHG | HEART RATE: 67 BPM

## 2017-09-07 VITALS — HEART RATE: 67 BPM | RESPIRATION RATE: 17 BRPM | DIASTOLIC BLOOD PRESSURE: 95 MMHG | SYSTOLIC BLOOD PRESSURE: 134 MMHG

## 2017-09-07 VITALS — HEART RATE: 76 BPM

## 2017-09-07 VITALS — RESPIRATION RATE: 17 BRPM | SYSTOLIC BLOOD PRESSURE: 128 MMHG | HEART RATE: 93 BPM | DIASTOLIC BLOOD PRESSURE: 104 MMHG

## 2017-09-07 VITALS — SYSTOLIC BLOOD PRESSURE: 140 MMHG | DIASTOLIC BLOOD PRESSURE: 105 MMHG | RESPIRATION RATE: 18 BRPM | HEART RATE: 68 BPM

## 2017-09-07 VITALS — HEART RATE: 87 BPM | SYSTOLIC BLOOD PRESSURE: 134 MMHG | DIASTOLIC BLOOD PRESSURE: 84 MMHG | RESPIRATION RATE: 16 BRPM

## 2017-09-07 VITALS — SYSTOLIC BLOOD PRESSURE: 137 MMHG | HEART RATE: 70 BPM | RESPIRATION RATE: 16 BRPM | DIASTOLIC BLOOD PRESSURE: 87 MMHG

## 2017-09-07 VITALS — DIASTOLIC BLOOD PRESSURE: 106 MMHG | HEART RATE: 77 BPM | SYSTOLIC BLOOD PRESSURE: 145 MMHG | RESPIRATION RATE: 17 BRPM

## 2017-09-07 VITALS — DIASTOLIC BLOOD PRESSURE: 100 MMHG | SYSTOLIC BLOOD PRESSURE: 133 MMHG | HEART RATE: 73 BPM | RESPIRATION RATE: 18 BRPM

## 2017-09-07 VITALS — DIASTOLIC BLOOD PRESSURE: 85 MMHG | RESPIRATION RATE: 19 BRPM | SYSTOLIC BLOOD PRESSURE: 142 MMHG | HEART RATE: 70 BPM

## 2017-09-07 VITALS — HEART RATE: 76 BPM | DIASTOLIC BLOOD PRESSURE: 106 MMHG | SYSTOLIC BLOOD PRESSURE: 134 MMHG | RESPIRATION RATE: 17 BRPM

## 2017-09-07 VITALS — HEART RATE: 76 BPM | SYSTOLIC BLOOD PRESSURE: 133 MMHG | RESPIRATION RATE: 19 BRPM | DIASTOLIC BLOOD PRESSURE: 82 MMHG

## 2017-09-07 LAB
ALBUMIN SERPL-MCNC: 2.9 G/DL (ref 3.3–4.9)
ALBUMIN/GLOB SERPL: 0.87 {RATIO}
ALP SERPL-CCNC: 163 IU/L (ref 42–121)
ALT SERPL-CCNC: 148 IU/L (ref 13–69)
ANION GAP SERPL CALC-SCNC: 10 MMOL/L (ref 8–16)
AST SERPL-CCNC: 39 IU/L (ref 15–46)
BASOPHILS # BLD AUTO: 0 10^3/UL (ref 0–0.1)
BASOPHILS NFR BLD: 0.1 % (ref 0–2)
BILIRUB DIRECT SERPL-MCNC: 0 MG/DL (ref 0–0.2)
BILIRUB SERPL-MCNC: 0.3 MG/DL (ref 0.2–1.3)
BUN SERPL-MCNC: 32 MG/DL (ref 7–20)
CALCIUM SERPL-MCNC: 8.3 MG/DL (ref 8.4–10.2)
CHLORIDE SERPL-SCNC: 113 MMOL/L (ref 97–110)
CO2 SERPL-SCNC: 23 MMOL/L (ref 21–31)
CREAT SERPL-MCNC: 1.02 MG/DL (ref 0.44–1)
EOSINOPHIL # BLD: 0 10^3/UL (ref 0–0.5)
EOSINOPHIL NFR BLD: 0 % (ref 0–7)
ERYTHROCYTE [DISTWIDTH] IN BLOOD BY AUTOMATED COUNT: 15.6 % (ref 11.5–14.5)
GLOBULIN SER-MCNC: 3.3 G/DL (ref 1.3–3.2)
GLUCOSE SERPL-MCNC: 138 MG/DL (ref 70–220)
HCT VFR BLD CALC: 33 % (ref 37–47)
HGB BLD-MCNC: 10.6 G/DL (ref 12–16)
LYMPHOCYTES # BLD AUTO: 0.7 10^3/UL (ref 0.8–2.9)
LYMPHOCYTES NFR BLD AUTO: 8.9 % (ref 15–51)
MAGNESIUM SERPL-MCNC: 2.4 MG/DL (ref 1.7–2.5)
MCH RBC QN AUTO: 28 PG (ref 29–33)
MCHC RBC AUTO-ENTMCNC: 32.1 G/DL (ref 32–37)
MCV RBC AUTO: 87.3 FL (ref 82–101)
MONOCYTES # BLD: 0.4 10^3/UL (ref 0.3–0.9)
MONOCYTES NFR BLD: 5.3 % (ref 0–11)
NEUTROPHILS NFR BLD AUTO: 84.7 % (ref 39–77)
NRBC # BLD MANUAL: 0 10^3/UL (ref 0–0)
NRBC BLD AUTO-RTO: 0 /100WBC (ref 0–0)
PHOSPHATE SERPL-MCNC: 3.3 MG/DL (ref 2.5–4.9)
PLATELET # BLD: 126 10^3/UL (ref 140–415)
PMV BLD AUTO: 9.9 FL (ref 7.4–10.4)
POTASSIUM SERPL-SCNC: 4.7 MMOL/L (ref 3.5–5.1)
PROT SERPL-MCNC: 6.2 G/DL (ref 6.1–8.1)
RBC # BLD AUTO: 3.78 10^6/UL (ref 4.2–5.4)
SODIUM SERPL-SCNC: 141 MMOL/L (ref 135–144)
TROPONIN I SERPL-MCNC: 0.08 NG/ML (ref 0–0.12)
WBC # BLD AUTO: 8.1 10^3/UL (ref 4.8–10.8)

## 2017-09-07 RX ADMIN — PANTOPRAZOLE SODIUM SCH MG: 40 TABLET, DELAYED RELEASE ORAL at 05:26

## 2017-09-07 RX ADMIN — MORPHINE SULFATE PRN MG: 2 INJECTION, SOLUTION INTRAMUSCULAR; INTRAVENOUS at 01:48

## 2017-09-07 RX ADMIN — MEROPENEM SCH MLS/HR: 1 INJECTION, POWDER, FOR SOLUTION INTRAVENOUS at 11:19

## 2017-09-07 RX ADMIN — ASPIRIN SCH MG: 81 TABLET, COATED ORAL at 09:06

## 2017-09-07 RX ADMIN — FOLIC ACID SCH MLS/HR: 5 INJECTION, SOLUTION INTRAMUSCULAR; INTRAVENOUS; SUBCUTANEOUS at 05:28

## 2017-09-07 RX ADMIN — FOLIC ACID SCH MLS/HR: 5 INJECTION, SOLUTION INTRAMUSCULAR; INTRAVENOUS; SUBCUTANEOUS at 11:56

## 2017-09-07 RX ADMIN — MORPHINE SULFATE PRN MG: 2 INJECTION, SOLUTION INTRAMUSCULAR; INTRAVENOUS at 11:29

## 2017-09-07 RX ADMIN — SERTRALINE HYDROCHLORIDE SCH MG: 50 TABLET ORAL at 21:00

## 2017-09-07 RX ADMIN — FERROUS SULFATE TAB 325 MG (65 MG ELEMENTAL FE) SCH MG: 325 (65 FE) TAB at 09:07

## 2017-09-07 RX ADMIN — MORPHINE SULFATE PRN MG: 2 INJECTION, SOLUTION INTRAMUSCULAR; INTRAVENOUS at 05:32

## 2017-09-07 RX ADMIN — DIGOXIN SCH MG: 125 TABLET ORAL at 13:45

## 2017-09-07 RX ADMIN — MORPHINE SULFATE PRN MG: 2 INJECTION, SOLUTION INTRAMUSCULAR; INTRAVENOUS at 18:09

## 2017-09-07 RX ADMIN — NYSTATIN SCH APPLIC: 100000 POWDER TOPICAL at 21:18

## 2017-09-07 RX ADMIN — MULTIPLE VITAMINS W/ MINERALS TAB SCH TAB: TAB at 09:06

## 2017-09-07 RX ADMIN — METOPROLOL TARTRATE SCH MG: 25 TABLET, FILM COATED ORAL at 09:07

## 2017-09-07 RX ADMIN — FLUCONAZOLE, SODIUM CHLORIDE SCH MLS/HR: 2 INJECTION INTRAVENOUS at 07:58

## 2017-09-07 RX ADMIN — MORPHINE SULFATE PRN MG: 2 INJECTION, SOLUTION INTRAMUSCULAR; INTRAVENOUS at 21:17

## 2017-09-07 RX ADMIN — MEROPENEM SCH MLS/HR: 1 INJECTION, POWDER, FOR SOLUTION INTRAVENOUS at 23:38

## 2017-09-07 RX ADMIN — MAGNESIUM OXIDE TAB 400 MG (241.3 MG ELEMENTAL MG) SCH MG: 400 (241.3 MG) TAB at 09:06

## 2017-09-07 RX ADMIN — METOPROLOL TARTRATE SCH MG: 25 TABLET, FILM COATED ORAL at 21:17

## 2017-09-07 NOTE — PN
Date/Time of Note


Date/Time of Note


DATE: 9/7/17 


TIME: 08:49





Assessment/Plan


VTE Prophylaxis


VTE Prophylaxis Intervention:  SCD's





Lines/Catheters


IV Catheter Type (from Nrs):  Peripheral IV


Central line site:  d/c fem line today


Urinary Cath still in place:  Yes


Reason Cath still needed:  other (indicate) (monitor UOP )





Assessment/Plan


Assessment/Plan


81 years old female with:





1.  Choledocholithiasis, likely ascending cholangitis with sepsis.  LFTs 

improved today but patient still with ongoing pain.


Continue IV antibiotics and s/p ERCP yesterday with placement of a stent, still 

with pain this AM 


Pancreatic enzymes pending, on full liquid currently.


Blood cultures no growth to date.





2.  Coronary artery disease, status post bypass surgery and aortic valve 

replacement.  


Troponin trended back down, slight elevation likely secondary to sepsis and 

demand ischemia.  


Given complains of epigastric pain, check troponin, EKG stable per cardiology.





3.  Chronic atrial fibrillation, currently in sinus rhythm, continue digoxin, 

rate controlled


Continue digoxin.


Resuming all cardiac meds. 





4.  Diabetes mellitus: Sliding scale insulin, A1c 6.0.





5.  Hypertension: Resolved hypotension, off pressors x 2 days now and back on 

Bblock. Blood pressure stable.





Prophylaxis: Protonix for GI prophylaxis, SCDs for DVT prophylaxis


Disposition: Transfer to Telemetry today.





Subjective


24 Hr Interval Summary


Free Text/Dictation


Patient still complaining of epigastric pan, EKG wnl, troponin and amylase/

lipase pending


labs otherwise stable 


On IV abx


Transfer to Tele





Exam/Review of Systems


Vital Signs


Vitals





 Vital Signs








  Date Time  Temp Pulse Resp B/P Pulse Ox O2 Delivery O2 Flow Rate FiO2


 


9/7/17 08:00 97.8 88 19 148/93 100 Nasal Cannula  


 


9/7/17 06:00       2.0 














 Intake and Output   


 


 9/6/17 9/6/17 9/7/17





 15:00 23:00 07:00


 


Intake Total 690 ml 1070 ml 1240 ml


 


Output Total 375 ml 420 ml 340 ml


 


Balance 315 ml 650 ml 900 ml











Exam


Constitutional:  alert, oriented, well developed


Respiratory:  clear to auscultation, normal air movement


Cardiovascular:  irregular rhythm (controlled A fib)


Gastrointestinal:  soft, tender (epigastic to LUQ and back )


Musculoskeletal:  nl extremities to inspection


Extremities:  normal pulses


Neurological:  CNS II-XII intact, nl mental status, nl speech, other (

generalized weakness)





Results


Result Diagram:  


9/7/17 0520                                                                    

            9/7/17 0520





Results 24 hrs





Laboratory Tests








Test


  9/6/17


12:17 9/6/17


17:28 9/6/17


21:04 9/7/17


01:49


 


Bedside Glucose 97   169   208   141  














Test


  9/7/17


05:20 9/7/17


05:22 9/7/17


08:03 


 


 


White Blood Count 8.1     


 


Red Blood Count 3.78  L   


 


Hemoglobin 10.6  L   


 


Hematocrit 33.0  L   


 


Mean Corpuscular Volume 87.3     


 


Mean Corpuscular Hemoglobin 28.0  L   


 


Mean Corpuscular Hemoglobin


Concent 32.1  


  


  


  


 


 


Red Cell Distribution Width 15.6  H   


 


Platelet Count 126  #L   


 


Mean Platelet Volume 9.9     


 


Neutrophils % 84.7  H   


 


Lymphocytes % 8.9  L   


 


Monocytes % 5.3     


 


Eosinophils % 0.0     


 


Basophils % 0.1     


 


Nucleated Red Blood Cells % 0.0     


 


Neutrophils # (Manual) 6.9     


 


Lymphocytes # 0.7  L   


 


Monocytes # 0.4     


 


Eosinophils # 0.0     


 


Basophils # 0.0     


 


Nucleated Red Blood Cells # 0.0     


 


Sodium Level 141     


 


Potassium Level 4.7     


 


Chloride Level 113  H   


 


Carbon Dioxide Level 23     


 


Anion Gap 10  #   


 


Blood Urea Nitrogen 32  H   


 


Creatinine 1.02  H   


 


Glucose Level 138     


 


Calcium Level 8.3  L   


 


Phosphorus Level 3.3     


 


Magnesium Level 2.4     


 


Total Bilirubin 0.3     


 


Direct Bilirubin 0.00     


 


Indirect Bilirubin 0.3     


 


Aspartate Amino Transf


(AST/SGOT) 39  


  


  


  


 


 


Alanine Aminotransferase


(ALT/SGPT) 148  H


  


  


  


 


 


Alkaline Phosphatase 163  H   


 


Total Protein 6.2     


 


Albumin 2.9  L   


 


Globulin 3.30  H   


 


Albumin/Globulin Ratio 0.87     


 


Bedside Glucose  133   130   











Medications


Medications





 Current Medications


Fluconazole/ Sodium Chloride (Diflucan 100 Mg/ NS (Pmx)) 50 ml @ 50 mls/hr Q24H 

IVPB  Last administered on 9/7/17at 07:58; Admin Dose 50 MLS/HR;  Start 9/5/17 

at 07:30


Aspirin (Halfprin) 81 mg DAILY PO  Last administered on 9/5/17at 10:14; Admin 

Dose 81 MG;  Start 9/5/17 at 09:00


Ferrous Sulfate (Ferrous Sulfate (Ec)) 325 mg DAILY PO  Last administered on 9/5 /17at 10:14; Admin Dose 325 MG;  Start 9/5/17 at 09:00


Magnesium Oxide (Mag-Ox 400) 400 mg DAILY PO  Last administered on 9/5/17at 10:

14; Admin Dose 400 MG;  Start 9/5/17 at 09:00


Metoclopramide HCl (Reglan) 10 mg Q6H  PRN PO NAUSEA AND/OR VOMITING;  Start 9/5 /17 at 07:30


Multivitamins/ Minerals (Theragran-M) 1 tab DAILY PO  Last administered on 9/5/ 17at 10:14; Admin Dose 1 TAB;  Start 9/5/17 at 09:00


Sertraline HCl 25 mg 25 mg QHS PO  Last administered on 9/6/17at 20:57; Admin 

Dose 25 MG;  Start 9/5/17 at 21:00


Sodium Chloride 1,000 ml @  80 mls/hr [id number] IV  Last administered on 9/7/17at 

05:28; Admin Dose 80 MLS/HR;  Start 9/5/17 at 10:00


Meropenem/Sodium Chloride (Merrem 1 Gm/50 ml (Pmx)) 50 ml @  100 mls/hr Q12 

IVPB  Last administered on 9/6/17at 20:53; Admin Dose 100 MLS/HR;  Start 9/5/17 

at 11:00


Insulin Aspart (Novolog Insulin Pen) NOVOLOG *MILD* ALGORI... Q4 SC  Last 

administered on 9/7/17at 01:57; Admin Dose 1 UNIT;  Start 9/5/17 at 13:00


Digoxin (Digoxin) 0.125 mg Q2D@13 PO ;  Start 9/7/17 at 13:00


Ondansetron HCl (Zofran Inj) 4 mg Q6H  PRN IV NAUSEA AND/OR VOMITING;  Start 9/5 /17 at 10:30


Nitroglycerin (Nitroglycerin (Sl Tab) 0.4 Mg) 1 tab Q5M  PRN SL CHEST PAIN;  

Start 9/5/17 at 10:30


Acetaminophen (Tylenol Tab) 650 mg Q6H  PRN PO PAIN LEVEL 1-3 OR FEVER Last 

administered on 9/6/17at 01:49; Admin Dose 650 MG;  Start 9/5/17 at 10:30


Acetaminophen (Tylenol Supp) 650 mg Q6H  PRN [place] PAIN LEVEL 1-3 OR FEVER;  Start 

9/5/17 at 10:30


Morphine Sulfate (morphine) 2 mg Q4H  PRN IV PAIN LEVEL 7-10 Last administered 

on 9/7/17at 05:32; Admin Dose 2 MG;  Start 9/5/17 at 10:30


Docusate Sodium (Colace) 100 mg Q12H  PRN PO CONSTIPATION;  Start 9/5/17 at 10:

30


Magnesium Hydroxide (Milk Of Mag) 30 ml DAILY  PRN PO CONSTIPATION;  Start 9/5/ 17 at 10:30


Bisacodyl (Dulcolax Supp) 10 mg DAILY  PRN [place] CONSTIPATION;  Start 9/5/17 at 10:

30


Miscellaneous Information 1 ea NOTE XX ;  Start 9/5/17 at 10:30


Glucose (Glutose) 15 gm Q15M  PRN PO DECREASED GLUCOSE;  Start 9/5/17 at 10:30


Glucose (Glutose) 22.5 gm Q15M  PRN PO DECREASED GLUCOSE;  Start 9/5/17 at 10:30


Dextrose (D50w Syringe) 25 ml Q15M  PRN IV DECREASED GLUCOSE;  Start 9/5/17 at 

10:30


Dextrose (D50w Syringe) 50 ml Q15M  PRN IV DECREASED GLUCOSE;  Start 9/5/17 at 

10:30


Glucagon (Glucagen) 1 mg Q15M  PRN IM DECREASED GLUCOSE;  Start 9/5/17 at 10:30


Glucose (Glutose) 15 gm Q15M  PRN BUCCAL DECREASED GLUCOSE;  Start 9/5/17 at 10:

30


Pantoprazole (Protonix Tab) 40 mg DAILY@06 PO  Last administered on 9/7/17at 05:

26; Admin Dose 40 MG;  Start 9/7/17 at 06:00


Metoprolol Tartrate (Lopressor) 25 mg BID PO  Last administered on 9/6/17at 20:

57; Admin Dose 25 MG;  Start 9/6/17 at 21:00











SHAYY DAVIS Sep 7, 2017 08:59

## 2017-09-07 NOTE — RADRPT
PROCEDURE:   X-ray fluoroscopy guidance

 

CLINICAL INDICATION:   ERCP RM 5 O.R.

 

TECHNIQUE:  Fluoroscopic guidance was utilized for intraoperative procedure.

 

COMPARISON:   None.

 

FINDINGS:

Fluoroscopic guidance was utilized for intraoperative procedure.  0.4 minutes of fluoroscopy time wa
s utilized for the procedure.  4 x-ray images were obtained during the procedure.

An appropriately positioned common bile duct stent is noted.

 

 

IMPRESSION:

X-ray fluoroscopic guidance utilized for intraoperative procedure.

 

Successful placement of a common bile duct stent.

 

Please see procedure note details.

 

 

RPTAT: EE

_____________________________________________ 

Physician Mona           Date    Time 

Electronically viewed and signed by Physician Mona on 09/07/2017 09:22 

 

D:  09/07/2017 09:22  T:  09/07/2017 09:22

CODI/

## 2017-09-07 NOTE — RADRPT
Vent Rate: 79 bpm

RR Interval: 0 msec

MN Interval: 0 msec

QRS Duration: 72 msec

QT Interval: 396 msec

QTC Interval: 454 msec

P-R-T Axis: 0 - 14 - 49 degrees

 

 Atrial fibrillation

 Low voltage QRS

 Cannot rule out Anteroseptal infarct , age undetermined

 Abnormal ECG

 

Electronically Signed By: Eguene Stallworth

[id number]

## 2017-09-07 NOTE — CONS
Date/Time of Note


Date/Time of Note


DATE: 9/7/17 


TIME: 08:32





Assessment/Plan


Assessment/Plan


Chief Complaint/Hosp Course


Impression:








Elevated trop: likely type ii given hypotension sepsis in setting of known cad 

hx s/p cabg. trop was low level. no acute wma on echo. overall normal lvef. 

cont asa/statin/bb.  will trend troponin.  may need stress vs. invasive testing 

when acute issues resolve





CAD chronic s/p cabg: as above





s/p AVR- normal fxn on previous echo





Atrial fibrillation chronic- rate controlled with metoprolol. digoxin. not on 

anticoag chronically given immobility and fall history





Pain- likely from choledocholithiasis, ? pancreatitis. per GI





Sepsis- resolved. bp stable off pressors.


Problems:  





Consultation Date/Type/Reason


Admit Date/Time


Sep 5, 2017 at 04:35


Initial Consult Date


9/6/17


Type of Consultation:  Cardiology


Referring Provider:  SHAYY DAVIS





24 HR Interval Summary


Free Text/Dictation


pt s/p ercp yesterday, had stone in CBD s/p stenting.  pt reports upper abd pain

, band like.  appears comfortable while resting. states feels sob with deep 

breaths, no tachypnea noted. 





ekg reviewed: afib, rate controlled, no acute changes.





Detailed Summary


Eyes:  no complaints


ENT:  no complaints


Respiratory:  shortness of breath


Cardiovascular:  chest pain


Gastrointestinal:  pain





Exam/Review of Systems


Vital Signs


Vitals





 Vital Signs








  Date Time  Temp Pulse Resp B/P Pulse Ox O2 Delivery O2 Flow Rate FiO2


 


9/7/17 08:00 97.8 88 19 148/93 100 Nasal Cannula  


 


9/7/17 06:00       2.0 














 Intake and Output   


 


 9/6/17 9/6/17 9/7/17





 15:00 23:00 07:00


 


Intake Total 690 ml 1070 ml 1240 ml


 


Output Total 375 ml 420 ml 340 ml


 


Balance 315 ml 650 ml 900 ml











Exam


Constitutional:  alert, oriented


Psych:  nl mood/affect, no complaints


Head:  atraumatic, normocephalic


Eyes:  nl conjunctiva


ENMT:  nl external ears & nose, nl lips & teeth


Neck:  non-tender, supple, 


   No jvd


Respiratory:  other (carckles in bases)


Cardiovascular:  irregular rhythm, nl pulses, systolic murmur, 


   No bruits, No edema, No gallop, No jugular venous distention (JVD), No rub


Gastrointestinal:  distended, soft, tender


Musculoskeletal:  nl extremities to inspection


Extremities:  normal pulses


Neurological:  CNS II-XII intact, nl mental status, nl speech


Skin:  nl turgor





Results


Result Diagram:  


9/7/17 0520                                                                    

            9/7/17 0520





Results 24 hrs





Laboratory Tests








Test


  9/6/17


08:45 9/6/17


12:17 9/6/17


17:28 9/6/17


21:04


 


Bedside Glucose 118   97   169   208  














Test


  9/7/17


01:49 9/7/17


05:20 9/7/17


05:22 9/7/17


08:03


 


Bedside Glucose 141    133   130  


 


White Blood Count  8.1    


 


Red Blood Count  3.78  L  


 


Hemoglobin  10.6  L  


 


Hematocrit  33.0  L  


 


Mean Corpuscular Volume  87.3    


 


Mean Corpuscular Hemoglobin  28.0  L  


 


Mean Corpuscular Hemoglobin


Concent 


  32.1  


  


  


 


 


Red Cell Distribution Width  15.6  H  


 


Platelet Count  126  #L  


 


Mean Platelet Volume  9.9    


 


Neutrophils %  84.7  H  


 


Lymphocytes %  8.9  L  


 


Monocytes %  5.3    


 


Eosinophils %  0.0    


 


Basophils %  0.1    


 


Nucleated Red Blood Cells %  0.0    


 


Neutrophils # (Manual)  6.9    


 


Lymphocytes #  0.7  L  


 


Monocytes #  0.4    


 


Eosinophils #  0.0    


 


Basophils #  0.0    


 


Nucleated Red Blood Cells #  0.0    


 


Sodium Level  141    


 


Potassium Level  4.7    


 


Chloride Level  113  H  


 


Carbon Dioxide Level  23    


 


Anion Gap  10  #  


 


Blood Urea Nitrogen  32  H  


 


Creatinine  1.02  H  


 


Glucose Level  138    


 


Calcium Level  8.3  L  


 


Phosphorus Level  3.3    


 


Magnesium Level  2.4    


 


Total Bilirubin  0.3    


 


Direct Bilirubin  0.00    


 


Indirect Bilirubin  0.3    


 


Aspartate Amino Transf


(AST/SGOT) 


  39  


  


  


 


 


Alanine Aminotransferase


(ALT/SGPT) 


  148  H


  


  


 


 


Alkaline Phosphatase  163  H  


 


Total Protein  6.2    


 


Albumin  2.9  L  


 


Globulin  3.30  H  


 


Albumin/Globulin Ratio  0.87    











Medications


Medications





 Current Medications


Fluconazole/ Sodium Chloride (Diflucan 100 Mg/ NS (Pmx)) 50 ml @ 50 mls/hr Q24H 

IVPB  Last administered on 9/7/17at 07:58; Admin Dose 50 MLS/HR;  Start 9/5/17 

at 07:30


Aspirin (Halfprin) 81 mg DAILY PO  Last administered on 9/5/17at 10:14; Admin 

Dose 81 MG;  Start 9/5/17 at 09:00


Ferrous Sulfate (Ferrous Sulfate (Ec)) 325 mg DAILY PO  Last administered on 9/5 /17at 10:14; Admin Dose 325 MG;  Start 9/5/17 at 09:00


Magnesium Oxide (Mag-Ox 400) 400 mg DAILY PO  Last administered on 9/5/17at 10:

14; Admin Dose 400 MG;  Start 9/5/17 at 09:00


Metoclopramide HCl (Reglan) 10 mg Q6H  PRN PO NAUSEA AND/OR VOMITING;  Start 9/5 /17 at 07:30


Multivitamins/ Minerals (Theragran-M) 1 tab DAILY PO  Last administered on 9/5/ 17at 10:14; Admin Dose 1 TAB;  Start 9/5/17 at 09:00


Sertraline HCl 25 mg 25 mg QHS PO  Last administered on 9/6/17at 20:57; Admin 

Dose 25 MG;  Start 9/5/17 at 21:00


Sodium Chloride 1,000 ml @  80 mls/hr [id number] IV  Last administered on 9/7/17at 

05:28; Admin Dose 80 MLS/HR;  Start 9/5/17 at 10:00


Meropenem/Sodium Chloride (Merrem 1 Gm/50 ml (Pmx)) 50 ml @  100 mls/hr Q12 

IVPB  Last administered on 9/6/17at 20:53; Admin Dose 100 MLS/HR;  Start 9/5/17 

at 11:00


Insulin Aspart (Novolog Insulin Pen) NOVOLOG *MILD* ALGORI... Q4 SC  Last 

administered on 9/7/17at 01:57; Admin Dose 1 UNIT;  Start 9/5/17 at 13:00


Digoxin (Digoxin) 0.125 mg Q2D@13 PO ;  Start 9/7/17 at 13:00


Ondansetron HCl (Zofran Inj) 4 mg Q6H  PRN IV NAUSEA AND/OR VOMITING;  Start 9/5 /17 at 10:30


Nitroglycerin (Nitroglycerin (Sl Tab) 0.4 Mg) 1 tab Q5M  PRN SL CHEST PAIN;  

Start 9/5/17 at 10:30


Acetaminophen (Tylenol Tab) 650 mg Q6H  PRN PO PAIN LEVEL 1-3 OR FEVER Last 

administered on 9/6/17at 01:49; Admin Dose 650 MG;  Start 9/5/17 at 10:30


Acetaminophen (Tylenol Supp) 650 mg Q6H  PRN [place] PAIN LEVEL 1-3 OR FEVER;  Start 

9/5/17 at 10:30


Morphine Sulfate (morphine) 2 mg Q4H  PRN IV PAIN LEVEL 7-10 Last administered 

on 9/7/17at 05:32; Admin Dose 2 MG;  Start 9/5/17 at 10:30


Docusate Sodium (Colace) 100 mg Q12H  PRN PO CONSTIPATION;  Start 9/5/17 at 10:

30


Magnesium Hydroxide (Milk Of Mag) 30 ml DAILY  PRN PO CONSTIPATION;  Start 9/5/ 17 at 10:30


Bisacodyl (Dulcolax Supp) 10 mg DAILY  PRN [place] CONSTIPATION;  Start 9/5/17 at 10:

30


Miscellaneous Information 1 ea NOTE XX ;  Start 9/5/17 at 10:30


Glucose (Glutose) 15 gm Q15M  PRN PO DECREASED GLUCOSE;  Start 9/5/17 at 10:30


Glucose (Glutose) 22.5 gm Q15M  PRN PO DECREASED GLUCOSE;  Start 9/5/17 at 10:30


Dextrose (D50w Syringe) 25 ml Q15M  PRN IV DECREASED GLUCOSE;  Start 9/5/17 at 

10:30


Dextrose (D50w Syringe) 50 ml Q15M  PRN IV DECREASED GLUCOSE;  Start 9/5/17 at 

10:30


Glucagon (Glucagen) 1 mg Q15M  PRN IM DECREASED GLUCOSE;  Start 9/5/17 at 10:30


Glucose (Glutose) 15 gm Q15M  PRN BUCCAL DECREASED GLUCOSE;  Start 9/5/17 at 10:

30


Pantoprazole (Protonix Tab) 40 mg DAILY@06 PO  Last administered on 9/7/17at 05:

26; Admin Dose 40 MG;  Start 9/7/17 at 06:00


Metoprolol Tartrate (Lopressor) 25 mg BID PO  Last administered on 9/6/17at 20:

57; Admin Dose 25 MG;  Start 9/6/17 at 21:00





Procedures


Procedures


giprocedure note reviewed





imaging reports reviewed in emr





tele reviewed: afib, rate controlled











CEDRICK LUTHER Sep 7, 2017 08:38

## 2017-09-07 NOTE — PN
DATE:  09/07/2017

 

SUBJECTIVE DATA:  At this time, she does have some abdominal

chest pain more in the lower part of the chest and also upper

abdominal discomfort.  As it has been documented, she had a small

myocardial infarction.  She has got significant cholangitis.

ERCP showed large stone in the common bile duct and because of

the cardiac condition, stone was not planned to be removed at the

time, immediate palliative thing was to put a stent so that we

can relieve the blockage and release the cholangitis type of

picture.

 

OBJECTIVE DATA:

ABDOMEN:  Soft, nontender.

 

LABORATORY:  WBC count 8100, hemoglobin 10.6.  The bilirubin 0.3,

AST 39, , ALK phos 163.  Troponin is getting better.

 

IMPRESSION:

1.   Cholangitis is improving.

2.   She had a small myocardial infarction.

3.   She has got a choledocholithiasis.

 

PLAN:  Continue present management.

 

 

 

Dictated By:  Bon Strong MD

 

/grant/jerardo

Job#:  [id number]/Document#:  [id number]

D:  09/07/2017 17:33

T:  09/07/2017 18:01

CC:  Karis Lowery MD;*EndCC*

## 2017-09-08 VITALS — SYSTOLIC BLOOD PRESSURE: 131 MMHG | RESPIRATION RATE: 17 BRPM | DIASTOLIC BLOOD PRESSURE: 90 MMHG

## 2017-09-08 VITALS — HEART RATE: 65 BPM

## 2017-09-08 VITALS — SYSTOLIC BLOOD PRESSURE: 138 MMHG | DIASTOLIC BLOOD PRESSURE: 63 MMHG | RESPIRATION RATE: 17 BRPM

## 2017-09-08 VITALS — DIASTOLIC BLOOD PRESSURE: 57 MMHG | SYSTOLIC BLOOD PRESSURE: 100 MMHG | RESPIRATION RATE: 18 BRPM

## 2017-09-08 VITALS — DIASTOLIC BLOOD PRESSURE: 61 MMHG | SYSTOLIC BLOOD PRESSURE: 141 MMHG

## 2017-09-08 VITALS — DIASTOLIC BLOOD PRESSURE: 91 MMHG | SYSTOLIC BLOOD PRESSURE: 153 MMHG | RESPIRATION RATE: 20 BRPM

## 2017-09-08 VITALS — HEART RATE: 80 BPM

## 2017-09-08 VITALS — HEART RATE: 90 BPM

## 2017-09-08 VITALS — HEART RATE: 83 BPM

## 2017-09-08 VITALS — HEART RATE: 72 BPM

## 2017-09-08 VITALS — HEART RATE: 98 BPM

## 2017-09-08 LAB
ALBUMIN SERPL-MCNC: 3.2 G/DL (ref 3.3–4.9)
ALP SERPL-CCNC: 167 IU/L (ref 42–121)
ALT SERPL-CCNC: 94 IU/L (ref 13–69)
AMYLASE SERPL-CCNC: 89 U/L (ref 11–123)
ANION GAP SERPL CALC-SCNC: 10 MMOL/L (ref 8–16)
AST SERPL-CCNC: 87 IU/L (ref 15–46)
BASOPHILS # BLD AUTO: 0 10^3/UL (ref 0–0.1)
BASOPHILS NFR BLD: 0.2 % (ref 0–2)
BILIRUB DIRECT SERPL-MCNC: 0 MG/DL (ref 0–0.2)
BILIRUB SERPL-MCNC: 0.5 MG/DL (ref 0.2–1.3)
BUN SERPL-MCNC: 28 MG/DL (ref 7–20)
CALCIUM SERPL-MCNC: 8.8 MG/DL (ref 8.4–10.2)
CHLORIDE SERPL-SCNC: 113 MMOL/L (ref 97–110)
CO2 SERPL-SCNC: 21 MMOL/L (ref 21–31)
CREAT SERPL-MCNC: 0.88 MG/DL (ref 0.44–1)
EOSINOPHIL # BLD: 0.5 10^3/UL (ref 0–0.5)
EOSINOPHIL NFR BLD: 3.5 % (ref 0–7)
ERYTHROCYTE [DISTWIDTH] IN BLOOD BY AUTOMATED COUNT: 15.7 % (ref 11.5–14.5)
GLUCOSE SERPL-MCNC: 84 MG/DL (ref 70–220)
HCT VFR BLD CALC: 37.9 % (ref 37–47)
HGB BLD-MCNC: 11.8 G/DL (ref 12–16)
LYMPHOCYTES # BLD AUTO: 2.3 10^3/UL (ref 0.8–2.9)
LYMPHOCYTES NFR BLD AUTO: 16.3 % (ref 15–51)
MAGNESIUM SERPL-MCNC: 2.2 MG/DL (ref 1.7–2.5)
MAGNESIUM SERPL-MCNC: 2.2 MG/DL (ref 1.7–2.5)
MCH RBC QN AUTO: 27.3 PG (ref 29–33)
MCHC RBC AUTO-ENTMCNC: 31.1 G/DL (ref 32–37)
MCV RBC AUTO: 87.7 FL (ref 82–101)
MONOCYTES # BLD: 1.1 10^3/UL (ref 0.3–0.9)
MONOCYTES NFR BLD: 8.1 % (ref 0–11)
NEUTROPHILS NFR BLD AUTO: 69.7 % (ref 39–77)
NRBC # BLD MANUAL: 0 10^3/UL (ref 0–0)
NRBC BLD AUTO-RTO: 0 /100WBC (ref 0–0)
PHOSPHATE SERPL-MCNC: 2.7 MG/DL (ref 2.5–4.9)
PLATELET # BLD: 166 10^3/UL (ref 140–415)
PMV BLD AUTO: 10.9 FL (ref 7.4–10.4)
POSITIVE DIFF: (no result)
POTASSIUM SERPL-SCNC: 4.3 MMOL/L (ref 3.5–5.1)
PROT SERPL-MCNC: 6.7 G/DL (ref 6.1–8.1)
RBC # BLD AUTO: 4.32 10^6/UL (ref 4.2–5.4)
SODIUM SERPL-SCNC: 140 MMOL/L (ref 135–144)
TSH SERPL-ACNC: 4.1 MIU/L (ref 0.47–4.68)
WBC # BLD AUTO: 13.9 10^3/UL (ref 4.8–10.8)

## 2017-09-08 RX ADMIN — FLUCONAZOLE, SODIUM CHLORIDE SCH MLS/HR: 2 INJECTION INTRAVENOUS at 10:57

## 2017-09-08 RX ADMIN — MAGNESIUM OXIDE TAB 400 MG (241.3 MG ELEMENTAL MG) SCH MG: 400 (241.3 MG) TAB at 08:40

## 2017-09-08 RX ADMIN — FERROUS SULFATE TAB 325 MG (65 MG ELEMENTAL FE) SCH MG: 325 (65 FE) TAB at 08:39

## 2017-09-08 RX ADMIN — MULTIPLE VITAMINS W/ MINERALS TAB SCH TAB: TAB at 08:40

## 2017-09-08 RX ADMIN — MEROPENEM SCH MLS/HR: 1 INJECTION, POWDER, FOR SOLUTION INTRAVENOUS at 21:15

## 2017-09-08 RX ADMIN — ASPIRIN SCH MG: 81 TABLET, COATED ORAL at 08:39

## 2017-09-08 RX ADMIN — MORPHINE SULFATE PRN MG: 2 INJECTION, SOLUTION INTRAMUSCULAR; INTRAVENOUS at 21:35

## 2017-09-08 RX ADMIN — METOPROLOL TARTRATE SCH MG: 25 TABLET, FILM COATED ORAL at 08:40

## 2017-09-08 RX ADMIN — MORPHINE SULFATE PRN MG: 2 INJECTION, SOLUTION INTRAMUSCULAR; INTRAVENOUS at 06:26

## 2017-09-08 RX ADMIN — FOLIC ACID SCH MLS/HR: 5 INJECTION, SOLUTION INTRAMUSCULAR; INTRAVENOUS; SUBCUTANEOUS at 00:42

## 2017-09-08 RX ADMIN — FOLIC ACID SCH MLS/HR: 5 INJECTION, SOLUTION INTRAMUSCULAR; INTRAVENOUS; SUBCUTANEOUS at 13:00

## 2017-09-08 RX ADMIN — NYSTATIN SCH APPLIC: 100000 POWDER TOPICAL at 08:40

## 2017-09-08 RX ADMIN — METOPROLOL TARTRATE SCH MG: 25 TABLET, FILM COATED ORAL at 21:16

## 2017-09-08 RX ADMIN — NYSTATIN SCH APPLIC: 100000 POWDER TOPICAL at 21:17

## 2017-09-08 RX ADMIN — PANTOPRAZOLE SODIUM SCH MG: 40 TABLET, DELAYED RELEASE ORAL at 06:26

## 2017-09-08 RX ADMIN — SERTRALINE HYDROCHLORIDE SCH MG: 50 TABLET ORAL at 21:16

## 2017-09-08 RX ADMIN — MEROPENEM SCH MLS/HR: 1 INJECTION, POWDER, FOR SOLUTION INTRAVENOUS at 09:39

## 2017-09-08 NOTE — RADRPT
PROCEDURE: CT ABDOMEN AND PELVIS WITHOUT CONTRAST:   

 

CLINICAL INDICATION:   81 years of age,  female , abdominal pain.

 

COMPARISON:   CTA abdomen pelvis March 27, 2016

 

TECHNIQUE: CT of the abdomen and pelvis was performed without intravenous contrast. Oral contrast wa
s not  administered prior to the examination.

 

Coronal and sagittal reformatted images were obtained from the axial source images. Images were revi
ewed on a high-resolution PACS workstation.

 

Dose information: Based on a 32 cm phantom, the estimated radiation dose (CTDIvol mGy) for each seri
es in this exam is 22.6. The estimated cumulative dose (DLP mGy-cm) is 1109.

 One or more of the following dose reduction techniques were used:  

- Automated exposure control.

- Adjustment of the mA and/or kV according to patient size. 

- Use of iterative reconstruction technique.

 

FINDINGS:

 

In the absence of intravenous contrast, the study constitutes a limited assessment of the solid orga
ns, bowel and vessels.

 

LUNG BASES: Severe four-vessel coronary artery calcification and there is also calcification of the 
aortic valve. Ascending aortic aneurysm measuring 5.1 cm is incompletely imaged but unchanged from M
arch 27, 2016 where imaged. Small bilateral pleural effusions with bilateral dependent atelectasis.

 

ABDOMEN/PELVIS:

 

Liver: Normal noncontrast appearance. There is focal eventration of the right diaphragm that contain
s superior right lobe of liver.

 

Gallbladder: Surgically absent

 

Bile ducts: There is a common bile duct stent in good position. Pneumobilia in keeping with stent pa
tency.

 

Spleen: Normal noncontrast appearance.

 

Pancreas: Atrophic

 

Adrenal glands: Normal noncontrast appearance.

 

Kidneys and ureters: Bilateral renal parenchymal hypodensities likely represent cysts. 7.5 cm exophy
tic cyst lower pole right kidney is mildly complex demonstrates mild wall calcification. It is uncha
nged from CT performed March 27, 2016. Negative for urinary calculi or hydronephrosis.

 

Aorta and IVC: Atherosclerotic calcification and tortuosity aorta and iliac vessels. Negative for ab
dominal aortic aneurysm.

 

Lymph nodes: Normal noncontrast appearance.

 

Gastrointestinal tract: Mildly prominent loops of small bowel may indicate dysmotility. Negative for
 evidence of bowel obstruction.

 

Appendix: Normal

 

Bladder: Decompressed with Connelly catheter.

 

Pelvic Organs: The uterus and adnexa are unremarkable. Calcification at the uterine fundus likely re
presents a small degenerated fibroid.

 

Peritoneal cavity:  Small-volume ascites in the upper and lower abdomen. Negative for free intraperi
toneal air.

 

Abdominal wall: There is diffuse body wall edema. There is marked atrophy of the abdominal wall musc
ulature with laxity and protrusion of abdominal contents.

 

BONES: 

 

Musculoskeletal: There is a right hip replacement with a long stem femoral lavelle transfixed by a cercl
age wires and a side plate. There is heterotopic ossification around the right hip. Bones are osteop
enic. There are degenerative changes in the lower lumbar spine. Status post vertebroplasty at L1 and
 L2 with intrabody cement. There is partial collapse of the T12 and L3 vertebra from osteoporosis. N
o acute fractures are identified.

 

IMPRESSION:

 

Small bilateral pleural effusions with bilateral dependent atelectasis. 

 

Severe coronary artery calcification. Aortic valve calcification. Ascending aortic aneurysm measurin
g 5.1 cm is incompletely imaged but appears unchanged from March 27, 2016 where imaged.

 

Small volume ascites and body wall edema.

 

Common bile duct stent in good position. Pneumobilia is in keeping with stent patency.

 

7.5 cm mildly complex cyst lower pole right kidney is unchanged from March 27, 2016.

 

Negative for evidence of acute abnormality to explain abdominal pain.

 

 

 

 

 

RPTAT: HCTS

_____________________________________________ 

Physician Donovan           Date    Time 

Electronically viewed and signed by Physician Donovan on 09/08/2017 21:27 

 

D:  09/08/2017 21:27  T:  09/08/2017 21:27

/

## 2017-09-08 NOTE — CONS
Date/Time of Note


Date/Time of Note


DATE: 9/8/17 


TIME: 08:40





Assessment/Plan


Assessment/Plan


Chief Complaint/Hosp Course


Impression:








Elevated trop: likely type ii given hypotension sepsis in setting of known cad 

hx s/p cabg. trop was low level. no acute wma on echo. overall normal lvef. 

cont asa/statin/bb.  will trend troponin.  may need stress testing when acute 

issues resolve, no e/o of ongoing ischemia on ekg/troponin levels. 





CAD chronic s/p cabg: as above





s/p AVR- normal fxn on previous echo





Atrial fibrillation chronic- rate controlled with metoprolol. digoxin. not on 

anticoag chronically given immobility and fall history





Pain- likely GI, no e/o ongoing ischemia on ekg/enzymes and likely had a demand 

ischemic event with low trop elevation. given retained stone and abd distention

, would defer mgmt to GI





Sepsis- resolved. bp stable off pressors.


Problems:  





Consultation Date/Type/Reason


Admit Date/Time


Sep 5, 2017 at 04:35


Initial Consult Date


9/6/17


Type of Consultation:  Cardiology


Referring Provider:  SHAYY DAVIS





24 HR Interval Summary


Free Text/Dictation


pt with increased abd pain, no sob. denies any lightheadedness, dizziness. 





tele reviewed: afib rate controlled.





Detailed Summary


Eyes:  no complaints


ENT:  no complaints


Respiratory:  no complaints


Cardiovascular:  no complaints


Gastrointestinal:  pain


Genitourinary:  no complaints





Exam/Review of Systems


Vital Signs


Vitals





 Vital Signs








  Date Time  Temp Pulse Resp B/P Pulse Ox O2 Delivery O2 Flow Rate FiO2


 


9/8/17 08:23  90      


 


9/8/17 07:47 98.3  18 100/57 96   


 


9/7/17 20:30      Nasal Cannula 2.0 














 Intake and Output   


 


 9/7/17 9/7/17 9/8/17





 15:00 23:00 07:00


 


Intake Total 1530 ml 250 ml 


 


Output Total 490 ml 150 ml 


 


Balance 1040 ml 100 ml 











Exam


Constitutional:  alert, oriented


Psych:  nl mood/affect, no complaints


Head:  atraumatic, normocephalic


Eyes:  nl conjunctiva


ENMT:  nl external ears & nose, nl lips & teeth


Neck:  non-tender, supple, 


   No jvd


Respiratory:  other crackles in base 


Cardiovascular:  normal rate  irregular rhythm, nl pulses, systolic murmur, 


   No bruits, No edema, No gallop, No jugular venous distention (JVD), No rub


Gastrointestinal:  very distended, soft, tender


Musculoskeletal:  nl extremities to inspection


Extremities:  normal pulses


Neurological:  CNS II-XII intact, nl mental status, nl speech


Skin:  nl turgor





Results


Result Diagram:  


9/7/17 0520                                                                    

            9/7/17 0520





Results 24 hrs





Laboratory Tests








Test


  9/7/17


11:22 9/7/17


17:14 9/7/17


20:57


 


Bedside Glucose 128   105   119  











Medications


Medications





 Current Medications


Aspirin (Halfprin) 81 mg DAILY PO  Last administered on 9/7/17at 09:06; Admin 

Dose 81 MG;  Start 9/5/17 at 09:00


Ferrous Sulfate (Ferrous Sulfate (Ec)) 325 mg DAILY PO  Last administered on 9/7 /17at 09:07; Admin Dose 325 MG;  Start 9/5/17 at 09:00


Magnesium Oxide (Mag-Ox 400) 400 mg DAILY PO  Last administered on 9/7/17at 09:

06; Admin Dose 400 MG;  Start 9/5/17 at 09:00


Metoclopramide HCl (Reglan) 10 mg Q6H  PRN PO NAUSEA AND/OR VOMITING;  Start 9/5 /17 at 07:30


Multivitamins/ Minerals (Theragran-M) 1 tab DAILY PO  Last administered on 9/7/ 17at 09:06; Admin Dose 1 TAB;  Start 9/5/17 at 09:00


Sertraline HCl 25 mg 25 mg QHS PO  Last administered on 9/6/17at 20:57; Admin 

Dose 25 MG;  Start 9/5/17 at 21:00


Sodium Chloride 1,000 ml @  80 mls/hr [id number] IV  Last administered on 9/8/17at 

00:42; Admin Dose 80 MLS/HR;  Start 9/5/17 at 10:00


Meropenem/Sodium Chloride (Merrem 1 Gm/50 ml (Pmx)) 50 ml @  100 mls/hr Q12 

IVPB  Last administered on 9/7/17at 23:38; Admin Dose 100 MLS/HR;  Start 9/5/17 

at 11:00


Digoxin (Digoxin) 0.125 mg Q2D@13 PO  Last administered on 9/7/17at 13:45; 

Admin Dose 0.125 MG;  Start 9/7/17 at 13:00


Ondansetron HCl (Zofran Inj) 4 mg Q6H  PRN IV NAUSEA AND/OR VOMITING;  Start 9/5 /17 at 10:30


Nitroglycerin (Nitroglycerin (Sl Tab) 0.4 Mg) 1 tab Q5M  PRN SL CHEST PAIN;  

Start 9/5/17 at 10:30


Acetaminophen (Tylenol Tab) 650 mg Q6H  PRN PO PAIN LEVEL 1-3 OR FEVER Last 

administered on 9/6/17at 01:49; Admin Dose 650 MG;  Start 9/5/17 at 10:30


Acetaminophen (Tylenol Supp) 650 mg Q6H  PRN [place] PAIN LEVEL 1-3 OR FEVER;  Start 

9/5/17 at 10:30


Docusate Sodium (Colace) 100 mg Q12H  PRN PO CONSTIPATION;  Start 9/5/17 at 10:

30


Magnesium Hydroxide (Milk Of Mag) 30 ml DAILY  PRN PO CONSTIPATION;  Start 9/5/ 17 at 10:30


Bisacodyl (Dulcolax Supp) 10 mg DAILY  PRN [place] CONSTIPATION;  Start 9/5/17 at 10:

30


Miscellaneous Information 1 ea NOTE XX ;  Start 9/5/17 at 10:30


Glucose (Glutose) 15 gm Q15M  PRN PO DECREASED GLUCOSE;  Start 9/5/17 at 10:30


Glucose (Glutose) 22.5 gm Q15M  PRN PO DECREASED GLUCOSE;  Start 9/5/17 at 10:30


Dextrose (D50w Syringe) 25 ml Q15M  PRN IV DECREASED GLUCOSE;  Start 9/5/17 at 

10:30


Dextrose (D50w Syringe) 50 ml Q15M  PRN IV DECREASED GLUCOSE;  Start 9/5/17 at 

10:30


Glucagon (Glucagen) 1 mg Q15M  PRN IM DECREASED GLUCOSE;  Start 9/5/17 at 10:30


Glucose (Glutose) 15 gm Q15M  PRN BUCCAL DECREASED GLUCOSE;  Start 9/5/17 at 10:

30


Pantoprazole (Protonix Tab) 40 mg DAILY@06 PO  Last administered on 9/8/17at 06:

26; Admin Dose 40 MG;  Start 9/7/17 at 06:00


Metoprolol Tartrate (Lopressor) 25 mg BID PO  Last administered on 9/7/17at 21:

17; Admin Dose 25 MG;  Start 9/6/17 at 21:00


Morphine Sulfate 2 mg 2 mg Q3  PRN IV PAIN LEVEL 7-10 Last administered on 9/8/ 17at 06:26; Admin Dose 2 MG;  Start 9/7/17 at 09:00


Fluconazole/ Sodium Chloride (Diflucan 100 Mg/ NS (Pmx)) 50 ml @ 50 mls/hr Q24H 

IVPB ;  Start 9/8/17 at 08:00


Nystatin (Nystatin Powder) 1 applic BID TOP  Last administered on 9/7/17at 21:18

; Admin Dose 1 APPLIC;  Start 9/7/17 at 21:00











CEDRICK LUTHER Sep 8, 2017 08:49

## 2017-09-08 NOTE — CONS
DATE OF ADMISSION:  09/05/2017

 

DATE OF CONSULTATION:  09/08/2017

 

CHIEF COMPLAINT:  The patient complains of upper abdominal pain,

which she has been having since prior to the admission.  She is

alert and she says she is in pain, but she is much better.  She

has been passing gas.  She had a small bowel movement today.

 

PHYSICAL EXAMINATION:

GENERAL:  Patient is an 81-year-old, Setswana female, who at this

time is alert.  She is not in distress.

VITAL SIGNS:  Temperature 97.4, blood pressure 141/61.

HEART:  Normal heart sounds.

LUNGS:  Normal breath sounds.

ABDOMEN:  Distended, minimal epigastric tenderness.

RECTAL:  Showed empty rectum.

 

LABORATORY:  Workup shows a WBC count 13,900, hemoglobin 11.8,

bilirubin 0.5, AST 87, ALT 94, alk phos 167.

 

An ERCP was performed yesterday, which was a very quick procedure

because the patient had sustained a myocardial infarction, and

because of that, too much time was not spent doing the ERCP.  The

immediate goal was to put a stent into the bile duct and

terminate the procedure, and that was what was done.

 

CLINICAL IMPRESSION:  Patient complains of abdominal pain.  Rule

out pain caused by the common bile duct stone, although it is

probably unlikely. She does have cholangitis.  Reason for the

elevated white count is probably cholangitis. Rule out pneumonia.

 

I doubt if we are dealing with any bowel obstruction.

 

PLAN:  At this time recommend CAT scan of the abdomen and

continue antibiotic. There is a plan by the family to take the

patient to [facility].  If that is what they wish, it will be okay.

 

 

 

Dictated By:  Bon Strong MD

 

/grant/sierrac

Job#:  [id number]/Document#:  [id number]

D:  09/08/2017 19:19

T:  09/08/2017 20:36

CC:  Christophe Velazquez MD;*[facility]*

## 2017-09-08 NOTE — PN
Date/Time of Note


Date/Time of Note


DATE: 9/8/17 


TIME: 12:45





Assessment/Plan


VTE Prophylaxis


VTE Prophylaxis Intervention:  SCD's





Lines/Catheters


IV Catheter Type (from [facility]):  Saline Lock


Urinary Cath still in place:  Yes


Reason Cath still needed:  other (indicate) (For urine output monitoring, can 

be discontinued in the next couple of days if patient keeps improving)





Assessment/Plan


Assessment/Plan


81 years old female with:





1.  Choledocholithiasis, likely ascending cholangitis with sepsis.  Getting 

enzymes within normal, LFTs have been improving, LFTs pending today.  


Continue IV antibiotics and s/p ERCP days ago with placement of a urinary stent

, still planing of pain, discussed with Dr. Strong and family, patient's 

daughter wants the patient at [facility], lateral transfer will be arranged.  Change 

pain medication to tramadol.


Patient on mechanical soft.


Blood cultures no growth to date.





2.  Coronary artery disease, status post bypass surgery and aortic valve 

replacement.  Status post likely demand ischemia setting of sepsis.  Troponin 

back down to normal.


Appreciate cardiology evaluation and recommendations.


Continue current medications.


Patient back on aspirin.





3.  Chronic atrial fibrillation, currently in sinus rhythm, continue digoxin, 

rate controlled. 


Continue digoxin.  She is back on beta-blockers.





4.  Diabetes mellitus: Sliding scale insulin, A1c 6.0.





5.  Hypertension: Resolved hypotension, off pressors x3 days now and back on 

Bblock. Blood pressure stable.





Prophylaxis: Protonix for GI prophylaxis, SCDs for DVT prophylaxis


Disposition: Lateral transfer to [facility] and family preference, when bed available.





Subjective


24 Hr Interval Summary


Free Text/Dictation


According to family member, patient was having pain this morning not relieved 

with pain.  However based on the clinical is unlikely the patient was having 

that severe with pain, she has been eating every meal and not having pain.  We 

will change her pain medication to tramadol at this point and discontinue 

narcotics.


He is status post biliary stent for a large biliary stone.  According to GI 

they are unable to do a prolonged procedure due to the fact that the patient 

had a recent small acute MI/demand ischemia in setting of sepsis.  Patient does 

have a biliary stent in place currently to avoid further obstruction.  Her 

laboratory data are much improved LFTs are improved and bilirubin back to 

normal.  She is on meropenem for gram-negative rods UTI Pseudomonas and E. 

coli.  Blood cultures are negative.  She is clinically much improved.  However 

the family is insisting to have the patient at [facility] for further care.  Since his 

per family preferences this is a lateral transfer.  She does have an accepting 

physician.  She will be transferred when a bed is available.  Case management 

is involved.





Exam/Review of Systems


Vital Signs


Vitals





 Vital Signs








  Date Time  Temp Pulse Resp B/P Pulse Ox O2 Delivery O2 Flow Rate FiO2


 


9/8/17 12:28  83      


 


9/8/17 07:47 98.3  18 100/57 96   


 


9/7/17 20:30      Nasal Cannula 2.0 














 Intake and Output   


 


 9/7/17 9/7/17 9/8/17





 15:00 23:00 07:00


 


Intake Total 1530 ml 250 ml 


 


Output Total 490 ml 150 ml 


 


Balance 1040 ml 100 ml 











Exam


Constitutional:  alert, oriented, well developed


Respiratory:  clear to auscultation, normal air movement, other (To have some 

upper airway congestion)


Cardiovascular:  nl pulses, regular rate and rhythm


Gastrointestinal:  non-tender, other (Currently eating mechanical soft diet 

with no epigastric pain.), soft


Musculoskeletal:  nl extremities to inspection, nl gait and stance


Extremities:  normal pulses, other (Trace edema, no clubbing or cyanosis)


Neurological:  CNS II-XII intact, nl mental status, nl speech, other (At 

baseline, patient mostly does not ambulate much due to instability.)





Results


Result Diagram:  


9/8/17 1006                                                                    

            9/8/17 1006





Results 24 hrs





Laboratory Tests








Test


  9/7/17


17:14 9/7/17


20:57 9/8/17


08:34 9/8/17


10:06


 


Bedside Glucose 105   119   83   


 


White Blood Count    13.9  #H


 


Red Blood Count    4.32  


 


Hemoglobin    11.8  L


 


Hematocrit    37.9  


 


Mean Corpuscular Volume    87.7  


 


Mean Corpuscular Hemoglobin    27.3  L


 


Mean Corpuscular Hemoglobin


Concent 


  


  


  31.1  L


 


 


Red Cell Distribution Width    15.7  H


 


Platelet Count    166  #


 


Mean Platelet Volume    10.9  H


 


Neutrophils %    69.7  


 


Lymphocytes %    16.3  


 


Monocytes %    8.1  


 


Eosinophils %    3.5  


 


Basophils %    0.2  


 


Nucleated Red Blood Cells %    0.0  


 


Neutrophils # (Manual)    9.7  H


 


Lymphocytes #    2.3  


 


Monocytes #    1.1  H


 


Eosinophils #    0.5  


 


Basophils #    0.0  


 


Nucleated Red Blood Cells #    0.0  


 


Sodium Level    140  


 


Potassium Level    4.3  


 


Chloride Level    113  H


 


Carbon Dioxide Level    21  


 


Anion Gap    10  


 


Blood Urea Nitrogen    28  H


 


Creatinine    0.88  


 


Glucose Level    84  #


 


Calcium Level    8.8  


 


Phosphorus Level    2.7  


 


Magnesium Level    2.2  


 


Amylase Level    89  


 


Lipase    95  


 


Thyroid Stimulating Hormone


(TSH) 


  


  


  4.100  


 














Test


  9/8/17


11:39 


  


  


 


 


Bedside Glucose 81     











Medications


Medications





 Current Medications


Aspirin (Halfprin) 81 mg DAILY PO  Last administered on 9/8/17at 08:39; Admin 

Dose 81 MG;  Start 9/5/17 at 09:00


Ferrous Sulfate (Ferrous Sulfate (Ec)) 325 mg DAILY PO  Last administered on 9/8 /17at 08:39; Admin Dose 325 MG;  Start 9/5/17 at 09:00


Magnesium Oxide (Mag-Ox 400) 400 mg DAILY PO  Last administered on 9/8/17at 08:

40; Admin Dose 400 MG;  Start 9/5/17 at 09:00


Metoclopramide HCl (Reglan) 10 mg Q6H  PRN PO NAUSEA AND/OR VOMITING;  Start 9/5 /17 at 07:30


Multivitamins/ Minerals (Theragran-M) 1 tab DAILY PO  Last administered on 9/8/ 17at 08:40; Admin Dose 1 TAB;  Start 9/5/17 at 09:00


Sertraline HCl 25 mg 25 mg QHS PO  Last administered on 9/6/17at 20:57; Admin 

Dose 25 MG;  Start 9/5/17 at 21:00


Sodium Chloride 1,000 ml @  80 mls/hr [id number] IV  Last administered on 9/8/17at 

00:42; Admin Dose 80 MLS/HR;  Start 9/5/17 at 10:00


Meropenem/Sodium Chloride (Merrem 1 Gm/50 ml (Pmx)) 50 ml @  100 mls/hr Q12 

IVPB  Last administered on 9/8/17at 09:39; Admin Dose 100 MLS/HR;  Start 9/5/17 

at 11:00


Digoxin (Digoxin) 0.125 mg Q2D@13 PO  Last administered on 9/7/17at 13:45; 

Admin Dose 0.125 MG;  Start 9/7/17 at 13:00


Ondansetron HCl (Zofran Inj) 4 mg Q6H  PRN IV NAUSEA AND/OR VOMITING;  Start 9/5 /17 at 10:30


Nitroglycerin (Nitroglycerin (Sl Tab) 0.4 Mg) 1 tab Q5M  PRN SL CHEST PAIN;  

Start 9/5/17 at 10:30


Acetaminophen (Tylenol Tab) 650 mg Q6H  PRN PO PAIN LEVEL 1-3 OR FEVER Last 

administered on 9/6/17at 01:49; Admin Dose 650 MG;  Start 9/5/17 at 10:30


Acetaminophen (Tylenol Supp) 650 mg Q6H  PRN [place] PAIN LEVEL 1-3 OR FEVER;  Start 

9/5/17 at 10:30


Docusate Sodium (Colace) 100 mg Q12H  PRN PO CONSTIPATION;  Start 9/5/17 at 10:

30


Magnesium Hydroxide (Milk Of Mag) 30 ml DAILY  PRN PO CONSTIPATION;  Start 9/5/ 17 at 10:30


Bisacodyl (Dulcolax Supp) 10 mg DAILY  PRN [place] CONSTIPATION;  Start 9/5/17 at 10:

30


Miscellaneous Information 1 ea NOTE XX ;  Start 9/5/17 at 10:30


Glucose (Glutose) 15 gm Q15M  PRN PO DECREASED GLUCOSE;  Start 9/5/17 at 10:30


Glucose (Glutose) 22.5 gm Q15M  PRN PO DECREASED GLUCOSE;  Start 9/5/17 at 10:30


Dextrose (D50w Syringe) 25 ml Q15M  PRN IV DECREASED GLUCOSE;  Start 9/5/17 at 

10:30


Dextrose (D50w Syringe) 50 ml Q15M  PRN IV DECREASED GLUCOSE;  Start 9/5/17 at 

10:30


Glucagon (Glucagen) 1 mg Q15M  PRN IM DECREASED GLUCOSE;  Start 9/5/17 at 10:30


Glucose (Glutose) 15 gm Q15M  PRN BUCCAL DECREASED GLUCOSE;  Start 9/5/17 at 10:

30


Pantoprazole (Protonix Tab) 40 mg DAILY@06 PO  Last administered on 9/8/17at 06:

26; Admin Dose 40 MG;  Start 9/7/17 at 06:00


Metoprolol Tartrate (Lopressor) 25 mg BID PO  Last administered on 9/8/17at 08:

40; Admin Dose 25 MG;  Start 9/6/17 at 21:00


Morphine Sulfate (morphine) 2 mg Q3  PRN IV PAIN LEVEL 7-10 Last administered 

on 9/8/17at 06:26; Admin Dose 2 MG;  Start 9/7/17 at 09:00


Nystatin 1 applic 1 applic BID TOP  Last administered on 9/8/17at 08:40; Admin 

Dose 1 APPLIC;  Start 9/7/17 at 21:00


Fluconazole/ Sodium Chloride (Diflucan 100 Mg/ NS (Pmx)) 50 ml @ 50 mls/hr Q24H 

IVPB  Last administered on 9/8/17at 10:57; Admin Dose 50 MLS/HR;  Start 9/8/17 

at 10:00











SHAYY DAVIS Sep 8, 2017 13:24

## 2017-09-08 NOTE — RADRPT
PROCEDURE:   XR, Chest. 

 

CLINICAL INDICATION:   Cough. 

 

TECHNIQUE:   AP chest 

 

COMPARISON:   Chest, 09/05/2017.

 

FINDINGS:

 

There is no acute infiltrate in the lungs.  No pleural effusion.  The heart is somewhat enlarged wit
h mild pulmonary venous congestion.  There is status post CABG.

 

IMPRESSION:

 

1.  Mild cardiomegaly with mild pulmonary venous congestion. Status post CABG.

 

RPTAT: GG

_____________________________________________ 

.Tc Agrawal MD, MD           Date    Time 

Electronically viewed and signed by .cT Agrawal MD, MD on 09/08/2017 13:58 

 

D:  09/08/2017 13:58  T:  09/08/2017 13:58

.Y/

## 2017-09-09 VITALS — SYSTOLIC BLOOD PRESSURE: 158 MMHG | RESPIRATION RATE: 20 BRPM | DIASTOLIC BLOOD PRESSURE: 85 MMHG

## 2017-09-09 VITALS — HEART RATE: 73 BPM

## 2017-09-09 VITALS — RESPIRATION RATE: 20 BRPM | DIASTOLIC BLOOD PRESSURE: 82 MMHG | SYSTOLIC BLOOD PRESSURE: 145 MMHG

## 2017-09-09 VITALS — RESPIRATION RATE: 20 BRPM | DIASTOLIC BLOOD PRESSURE: 94 MMHG | SYSTOLIC BLOOD PRESSURE: 155 MMHG

## 2017-09-09 VITALS — DIASTOLIC BLOOD PRESSURE: 103 MMHG | SYSTOLIC BLOOD PRESSURE: 158 MMHG | RESPIRATION RATE: 20 BRPM

## 2017-09-09 VITALS — HEART RATE: 74 BPM

## 2017-09-09 VITALS — HEART RATE: 87 BPM

## 2017-09-09 VITALS — HEART RATE: 88 BPM

## 2017-09-09 VITALS — DIASTOLIC BLOOD PRESSURE: 69 MMHG | RESPIRATION RATE: 20 BRPM | SYSTOLIC BLOOD PRESSURE: 132 MMHG

## 2017-09-09 VITALS — HEART RATE: 93 BPM

## 2017-09-09 VITALS — HEART RATE: 82 BPM

## 2017-09-09 LAB
ALBUMIN SERPL-MCNC: 3.1 G/DL (ref 3.3–4.9)
ALBUMIN/GLOB SERPL: 0.91 {RATIO}
ALP SERPL-CCNC: 146 IU/L (ref 42–121)
ALT SERPL-CCNC: 80 IU/L (ref 13–69)
AMYLASE SERPL-CCNC: 74 U/L (ref 11–123)
ANION GAP SERPL CALC-SCNC: 10 MMOL/L (ref 8–16)
AST SERPL-CCNC: 23 IU/L (ref 15–46)
BASOPHILS # BLD AUTO: 0 10^3/UL (ref 0–0.1)
BASOPHILS NFR BLD: 0.2 % (ref 0–2)
BILIRUB DIRECT SERPL-MCNC: 0 MG/DL (ref 0–0.2)
BILIRUB SERPL-MCNC: 0.9 MG/DL (ref 0.2–1.3)
BUN SERPL-MCNC: 24 MG/DL (ref 7–20)
CALCIUM SERPL-MCNC: 8.9 MG/DL (ref 8.4–10.2)
CHLORIDE SERPL-SCNC: 110 MMOL/L (ref 97–110)
CO2 SERPL-SCNC: 27 MMOL/L (ref 21–31)
CREAT SERPL-MCNC: 0.91 MG/DL (ref 0.44–1)
EOSINOPHIL # BLD: 0.6 10^3/UL (ref 0–0.5)
EOSINOPHIL NFR BLD: 4.5 % (ref 0–7)
ERYTHROCYTE [DISTWIDTH] IN BLOOD BY AUTOMATED COUNT: 14.8 % (ref 11.5–14.5)
GLOBULIN SER-MCNC: 3.4 G/DL (ref 1.3–3.2)
GLUCOSE SERPL-MCNC: 120 MG/DL (ref 70–220)
HCT VFR BLD CALC: 37 % (ref 37–47)
HGB BLD-MCNC: 11.7 G/DL (ref 12–16)
LYMPHOCYTES # BLD AUTO: 1.9 10^3/UL (ref 0.8–2.9)
LYMPHOCYTES NFR BLD AUTO: 15.3 % (ref 15–51)
MAGNESIUM SERPL-MCNC: 1.8 MG/DL (ref 1.7–2.5)
MCH RBC QN AUTO: 27.5 PG (ref 29–33)
MCHC RBC AUTO-ENTMCNC: 31.6 G/DL (ref 32–37)
MCV RBC AUTO: 86.9 FL (ref 82–101)
MONOCYTES # BLD: 0.8 10^3/UL (ref 0.3–0.9)
MONOCYTES NFR BLD: 6.7 % (ref 0–11)
NEUTROPHILS NFR BLD AUTO: 70.5 % (ref 39–77)
NRBC # BLD MANUAL: 0 10^3/UL (ref 0–0)
NRBC BLD AUTO-RTO: 0 /100WBC (ref 0–0)
PHOSPHATE SERPL-MCNC: 3 MG/DL (ref 2.5–4.9)
PLATELET # BLD: 196 10^3/UL (ref 140–415)
PMV BLD AUTO: 9.4 FL (ref 7.4–10.4)
POTASSIUM SERPL-SCNC: 3.6 MMOL/L (ref 3.5–5.1)
PROT SERPL-MCNC: 6.5 G/DL (ref 6.1–8.1)
RBC # BLD AUTO: 4.26 10^6/UL (ref 4.2–5.4)
SODIUM SERPL-SCNC: 143 MMOL/L (ref 135–144)
WBC # BLD AUTO: 12.6 10^3/UL (ref 4.8–10.8)

## 2017-09-09 RX ADMIN — DIGOXIN SCH MG: 125 TABLET ORAL at 12:45

## 2017-09-09 RX ADMIN — MORPHINE SULFATE PRN MG: 2 INJECTION, SOLUTION INTRAMUSCULAR; INTRAVENOUS at 20:21

## 2017-09-09 RX ADMIN — MEROPENEM SCH MLS/HR: 1 INJECTION, POWDER, FOR SOLUTION INTRAVENOUS at 09:21

## 2017-09-09 RX ADMIN — MORPHINE SULFATE PRN MG: 2 INJECTION, SOLUTION INTRAMUSCULAR; INTRAVENOUS at 06:48

## 2017-09-09 RX ADMIN — NYSTATIN SCH APPLIC: 100000 POWDER TOPICAL at 09:23

## 2017-09-09 RX ADMIN — METOPROLOL TARTRATE SCH MG: 25 TABLET, FILM COATED ORAL at 09:22

## 2017-09-09 RX ADMIN — ASPIRIN SCH MG: 81 TABLET, COATED ORAL at 09:22

## 2017-09-09 RX ADMIN — METOPROLOL TARTRATE SCH MG: 25 TABLET, FILM COATED ORAL at 19:36

## 2017-09-09 RX ADMIN — FLUCONAZOLE, SODIUM CHLORIDE SCH MLS/HR: 2 INJECTION INTRAVENOUS at 11:35

## 2017-09-09 RX ADMIN — MULTIPLE VITAMINS W/ MINERALS TAB SCH TAB: TAB at 09:21

## 2017-09-09 RX ADMIN — MORPHINE SULFATE PRN MG: 2 INJECTION, SOLUTION INTRAMUSCULAR; INTRAVENOUS at 11:36

## 2017-09-09 RX ADMIN — MAGNESIUM OXIDE TAB 400 MG (241.3 MG ELEMENTAL MG) SCH MG: 400 (241.3 MG) TAB at 09:21

## 2017-09-09 RX ADMIN — FERROUS SULFATE TAB 325 MG (65 MG ELEMENTAL FE) SCH MG: 325 (65 FE) TAB at 09:21

## 2017-09-09 RX ADMIN — PANTOPRAZOLE SODIUM SCH MG: 40 TABLET, DELAYED RELEASE ORAL at 06:45

## 2017-09-09 NOTE — PN
DATE:  09/09/2017

 

SUBJECTIVE DATA:  The patient was seen by me because of abdominal

pain and a common bile duct stone.  I performed the ERCP several

days ago.  She has a large CBD stone and because of that, very

quick CBD stent was placed to facilitate drainage of the bile.

Stone was found in the common bile duct but I did not remove the

stone because stone was large because she had a myocardial

infarction, I did not want to subject her for a prolonged therapy

to remove the stone and subsequently as I mentioned, CBD stent

was placed and the ERCP was terminated.  Now, patient continues

to have abdominal pain.  Because of this, I am examining the

patient.

 

OBJECTIVE DATA:

GENERAL:  On examination, she is alert.

VITAL SIGNS:  She is afebrile.

ABDOMEN:  Soft.

 

LABORATORY AND DIAGNOSTIC DATA:  I ordered a CAT scan of the

abdomen done to see why she has abdominal distention.  The CAT

scan of the abdomen shows evidence of a CBD stent in place.  The

stone noted in the right hepatic duct.

 

IMPRESSION:

1.   Persistent abdominal pain.

2.   Left chest pain.  Could be due to myocardial infarction and

I do not believe common duct stone and the stent in the place can

be causing abdominal pain.  However, upon discussion with the

family, family wants to take the patient to [facility] because patient

had a cholecystectomy there and that is the reason they want to

take her back to [facility].

 

PLAN:  Continue present management until she goes to [facility].

 

 

 

Dictated By:  Bon Strong MD

 

/grant/jerardo

Job#:  [id number]/Document#:  [id number]

D:  09/09/2017 13:44

T:  09/09/2017 13:50

## 2017-09-09 NOTE — CONS
Date/Time of Note


Date/Time of Note


DATE: 9/9/17 


TIME: 14:29





Assessment/Plan


Assessment/Plan


Additional Assessment/Plan


Impression:








Elevated trop: This is a low grade and expected elevation in this pt with known 

CAD and sepsis. The level is low grade and without ECG changes and normal Echo. 

Would proceed with required procedures for her active GI issues with CBD stone 

and infection. Continue with medical mgmt of CAD. 





CAD chronic s/p cabg: as above





s/p AVR- normal fxn on previous echo





Atrial fibrillation chronic- rate controlled with metoprolol. digoxin. not on 

anticoag chronically given immobility and fall history





Pain- likely GI, no e/o ongoing ischemia on ekg/enzymes and likely had a demand 

ischemic event with low trop elevation. given retained stone and abd distention

, would defer mgmt to GI





Sepsis- resolved. bp stable off pressors.





Consultation Date/Type/Reason


Admit Date/Time


Sep 5, 2017 at 04:35


Initial Consult Date


9/6/17


Type of Consultation:  Cardiology


Referring Provider:  SHAYY DAVIS





24 HR Interval Summary


Free Text/Dictation


Still with abdominal pain with deep breath and minimal movement. Ate small meal 

today and had a BM after enema.





Exam/Review of Systems


Vital Signs


Vitals





 Vital Signs








  Date Time  Temp Pulse Resp B/P Pulse Ox O2 Delivery O2 Flow Rate FiO2


 


9/9/17 12:04  74      


 


9/9/17 11:22 98.0  20 145/82 99   


 


9/9/17 08:33      Nasal Cannula 2.0 














 Intake and Output   


 


 9/8/17 9/8/17 9/9/17





 15:00 23:00 07:00


 


Intake Total  50 ml 300 ml


 


Output Total   1500 ml


 


Balance  50 ml -1200 ml











Exam


Constitutional:  alert, oriented, well developed


Psych:  no complaints


Head:  atraumatic, normocephalic


Eyes:  nl conjunctiva


ENMT:  nl external ears & nose


Neck:  supple, 


   No jvd


Respiratory:  clear to auscultation


Cardiovascular:  irregular rhythm, other (no S3, 2/6 HSM)


Gastrointestinal:  distended (and tympanic), other (mildly tender)


Musculoskeletal:  nl extremities to inspection


Neurological:  CNS II-XII intact


Skin:  diaphoresis (mild)





Results


Result Diagram:  


9/9/17 0841                                                                    

            9/9/17 0841





Results 24 hrs





Laboratory Tests








Test


  9/8/17


17:37 9/8/17


21:08 9/9/17


08:35 9/9/17


08:41


 


Bedside Glucose 101   117   111   


 


White Blood Count    12.6  H


 


Red Blood Count    4.26  


 


Hemoglobin    11.7  L


 


Hematocrit    37.0  


 


Mean Corpuscular Volume    86.9  


 


Mean Corpuscular Hemoglobin    27.5  L


 


Mean Corpuscular Hemoglobin


Concent 


  


  


  31.6  L


 


 


Red Cell Distribution Width    14.8  H


 


Platelet Count    196  


 


Mean Platelet Volume    9.4  


 


Neutrophils %    70.5  


 


Lymphocytes %    15.3  


 


Monocytes %    6.7  


 


Eosinophils %    4.5  


 


Basophils %    0.2  


 


Nucleated Red Blood Cells %    0.0  


 


Neutrophils # (Manual)    8.9  H


 


Lymphocytes #    1.9  


 


Monocytes #    0.8  


 


Eosinophils #    0.6  H


 


Basophils #    0.0  


 


Nucleated Red Blood Cells #    0.0  


 


Sodium Level    143  


 


Potassium Level    3.6  


 


Chloride Level    110  


 


Carbon Dioxide Level    27  


 


Anion Gap    10  


 


Blood Urea Nitrogen    24  H


 


Creatinine    0.91  


 


Glucose Level    120  


 


Calcium Level    8.9  


 


Phosphorus Level    3.0  


 


Magnesium Level    1.8  


 


Total Bilirubin    0.9  


 


Direct Bilirubin    0.00  


 


Indirect Bilirubin    0.9  


 


Aspartate Amino Transf


(AST/SGOT) 


  


  


  23  


 


 


Alanine Aminotransferase


(ALT/SGPT) 


  


  


  80  H


 


 


Alkaline Phosphatase    146  H


 


Total Protein    6.5  


 


Albumin    3.1  L


 


Globulin    3.40  H


 


Albumin/Globulin Ratio    0.91  


 


Amylase Level    74  


 


Lipase    78  














Test


  9/9/17


12:44 


  


  


 


 


Bedside Glucose 120     











Medications


Medications





 Current Medications


Aspirin (Halfprin) 81 mg DAILY PO  Last administered on 9/9/17at 09:22; Admin 

Dose 81 MG;  Start 9/5/17 at 09:00


Ferrous Sulfate (Ferrous Sulfate (Ec)) 325 mg DAILY PO  Last administered on 9/9 /17at 09:21; Admin Dose 325 MG;  Start 9/5/17 at 09:00


Magnesium Oxide (Mag-Ox 400) 400 mg DAILY PO  Last administered on 9/9/17at 09:

21; Admin Dose 400 MG;  Start 9/5/17 at 09:00


Metoclopramide HCl (Reglan) 10 mg Q6H  PRN PO NAUSEA AND/OR VOMITING;  Start 9/5 /17 at 07:30


Multivitamins/ Minerals (Theragran-M) 1 tab DAILY PO  Last administered on 9/9/ 17at 09:21; Admin Dose 1 TAB;  Start 9/5/17 at 09:00


Sertraline HCl 25 mg 25 mg QHS PO  Last administered on 9/8/17at 21:16; Admin 

Dose 25 MG;  Start 9/5/17 at 21:00


Meropenem/Sodium Chloride (Merrem 1 Gm/50 ml (Pmx)) 50 ml @  100 mls/hr Q12 

IVPB  Last administered on 9/9/17at 09:21; Admin Dose 100 MLS/HR;  Start 9/5/17 

at 11:00


Digoxin (Digoxin) 0.125 mg Q2D@13 PO  Last administered on 9/9/17at 12:45; 

Admin Dose 0.125 MG;  Start 9/7/17 at 13:00


Ondansetron HCl (Zofran Inj) 4 mg Q6H  PRN IV NAUSEA AND/OR VOMITING;  Start 9/5 /17 at 10:30


Nitroglycerin (Nitroglycerin (Sl Tab) 0.4 Mg) 1 tab Q5M  PRN SL CHEST PAIN;  

Start 9/5/17 at 10:30


Acetaminophen (Tylenol Tab) 650 mg Q6H  PRN PO PAIN LEVEL 1-3 OR FEVER Last 

administered on 9/6/17at 01:49; Admin Dose 650 MG;  Start 9/5/17 at 10:30


Acetaminophen (Tylenol Supp) 650 mg Q6H  PRN MT PAIN LEVEL 1-3 OR FEVER;  Start 

9/5/17 at 10:30


Docusate Sodium (Colace) 100 mg Q12H  PRN PO CONSTIPATION;  Start 9/5/17 at 10:

30


Magnesium Hydroxide (Milk Of Mag) 30 ml DAILY  PRN PO CONSTIPATION;  Start 9/5/ 17 at 10:30


Bisacodyl (Dulcolax Supp) 10 mg DAILY  PRN MT CONSTIPATION;  Start 9/5/17 at 10:

30


Miscellaneous Information 1 ea NOTE XX ;  Start 9/5/17 at 10:30


Glucose (Glutose) 15 gm Q15M  PRN PO DECREASED GLUCOSE;  Start 9/5/17 at 10:30


Glucose (Glutose) 22.5 gm Q15M  PRN PO DECREASED GLUCOSE;  Start 9/5/17 at 10:30


Dextrose (D50w Syringe) 25 ml Q15M  PRN IV DECREASED GLUCOSE;  Start 9/5/17 at 

10:30


Dextrose (D50w Syringe) 50 ml Q15M  PRN IV DECREASED GLUCOSE;  Start 9/5/17 at 

10:30


Glucagon (Glucagen) 1 mg Q15M  PRN IM DECREASED GLUCOSE;  Start 9/5/17 at 10:30


Glucose (Glutose) 15 gm Q15M  PRN BUCCAL DECREASED GLUCOSE;  Start 9/5/17 at 10:

30


Pantoprazole (Protonix Tab) 40 mg DAILY@06 PO  Last administered on 9/9/17at 06:

45; Admin Dose 40 MG;  Start 9/7/17 at 06:00


Metoprolol Tartrate (Lopressor) 25 mg BID PO  Last administered on 9/9/17at 09:

22; Admin Dose 25 MG;  Start 9/6/17 at 21:00


Morphine Sulfate (morphine) 2 mg Q3  PRN IV PAIN LEVEL 7-10 Last administered 

on 9/9/17at 11:36; Admin Dose 2 MG;  Start 9/7/17 at 09:00


Nystatin 1 applic 1 applic BID TOP  Last administered on 9/9/17at 09:23; Admin 

Dose 1 APPLIC;  Start 9/7/17 at 21:00


Fluconazole/ Sodium Chloride (Diflucan 100 Mg/ NS (Pmx)) 50 ml @ 50 mls/hr Q24H 

IVPB  Last administered on 9/9/17at 11:35; Admin Dose 50 MLS/HR;  Start 9/8/17 

at 10:00











AUGUSTO SOUSA MD Sep 9, 2017 14:31

## 2017-09-09 NOTE — PN
Date/Time of Note


Date/Time of Note


DATE: 9/9/17 


TIME: 12:27





Assessment/Plan


VTE Prophylaxis


VTE Prophylaxis Intervention:  other





Lines/Catheters


IV Catheter Type (from Nrsg):  Saline Lock


Urinary Cath still in place:  Yes (skin breakdown)


Reason Cath still needed:  skin wounds contaminated by urine





Assessment/Plan


Assessment/Plan


1. gi: cholangitis, cont current abx, still with pain, cont to monitor, cont abx





2. cards: troponin leak related to demand and sepsis


(b) aortic valve replacement


(c) ascending aortic aneurysm, stable





3. possible transfer t [facility]





Subjective


24 Hr Interval Summary


Free Text/Dictation


still complaining of abdominal pain with radiation to L shoulder


some nausea but tolerating some diet





Exam/Review of Systems


Vital Signs


Vitals





 Vital Signs








  Date Time  Temp Pulse Resp B/P Pulse Ox O2 Delivery O2 Flow Rate FiO2


 


9/9/17 12:04  74      


 


9/9/17 11:22 98.0  20 145/82 99   


 


9/9/17 08:33      Nasal Cannula 2.0 














 Intake and Output   


 


 9/8/17 9/8/17 9/9/17





 15:00 23:00 07:00


 


Intake Total  50 ml 300 ml


 


Output Total   1500 ml


 


Balance  50 ml -1200 ml











Exam


nad, soft distended,. tender


ctab, rrr





Results


Result Diagram:  


9/9/17 0841                                                                    

            9/9/17 0841





Results 24 hrs





Laboratory Tests








Test


  9/8/17


17:37 9/8/17


21:08 9/9/17


08:35 9/9/17


08:41


 


Bedside Glucose 101   117   111   


 


White Blood Count    12.6  H


 


Red Blood Count    4.26  


 


Hemoglobin    11.7  L


 


Hematocrit    37.0  


 


Mean Corpuscular Volume    86.9  


 


Mean Corpuscular Hemoglobin    27.5  L


 


Mean Corpuscular Hemoglobin


Concent 


  


  


  31.6  L


 


 


Red Cell Distribution Width    14.8  H


 


Platelet Count    196  


 


Mean Platelet Volume    9.4  


 


Neutrophils %    70.5  


 


Lymphocytes %    15.3  


 


Monocytes %    6.7  


 


Eosinophils %    4.5  


 


Basophils %    0.2  


 


Nucleated Red Blood Cells %    0.0  


 


Neutrophils # (Manual)    8.9  H


 


Lymphocytes #    1.9  


 


Monocytes #    0.8  


 


Eosinophils #    0.6  H


 


Basophils #    0.0  


 


Nucleated Red Blood Cells #    0.0  


 


Sodium Level    143  


 


Potassium Level    3.6  


 


Chloride Level    110  


 


Carbon Dioxide Level    27  


 


Anion Gap    10  


 


Blood Urea Nitrogen    24  H


 


Creatinine    0.91  


 


Glucose Level    120  


 


Calcium Level    8.9  


 


Phosphorus Level    3.0  


 


Magnesium Level    1.8  


 


Total Bilirubin    0.9  


 


Direct Bilirubin    0.00  


 


Indirect Bilirubin    0.9  


 


Aspartate Amino Transf


(AST/SGOT) 


  


  


  23  


 


 


Alanine Aminotransferase


(ALT/SGPT) 


  


  


  80  H


 


 


Alkaline Phosphatase    146  H


 


Total Protein    6.5  


 


Albumin    3.1  L


 


Globulin    3.40  H


 


Albumin/Globulin Ratio    0.91  


 


Amylase Level    74  


 


Lipase    78  











Medications


Medications





 Current Medications


Aspirin (Halfprin) 81 mg DAILY PO  Last administered on 9/9/17at 09:22; Admin 

Dose 81 MG;  Start 9/5/17 at 09:00


Ferrous Sulfate (Ferrous Sulfate (Ec)) 325 mg DAILY PO  Last administered on 9/9 /17at 09:21; Admin Dose 325 MG;  Start 9/5/17 at 09:00


Magnesium Oxide (Mag-Ox 400) 400 mg DAILY PO  Last administered on 9/9/17at 09:

21; Admin Dose 400 MG;  Start 9/5/17 at 09:00


Metoclopramide HCl (Reglan) 10 mg Q6H  PRN PO NAUSEA AND/OR VOMITING;  Start 9/5 /17 at 07:30


Multivitamins/ Minerals (Theragran-M) 1 tab DAILY PO  Last administered on 9/9/ 17at 09:21; Admin Dose 1 TAB;  Start 9/5/17 at 09:00


Sertraline HCl 25 mg 25 mg QHS PO  Last administered on 9/8/17at 21:16; Admin 

Dose 25 MG;  Start 9/5/17 at 21:00


Meropenem/Sodium Chloride (Merrem 1 Gm/50 ml (Pmx)) 50 ml @  100 mls/hr Q12 

IVPB  Last administered on 9/9/17at 09:21; Admin Dose 100 MLS/HR;  Start 9/5/17 

at 11:00


Digoxin (Digoxin) 0.125 mg Q2D@13 PO  Last administered on 9/7/17at 13:45; 

Admin Dose 0.125 MG;  Start 9/7/17 at 13:00


Ondansetron HCl (Zofran Inj) 4 mg Q6H  PRN IV NAUSEA AND/OR VOMITING;  Start 9/5 /17 at 10:30


Nitroglycerin (Nitroglycerin (Sl Tab) 0.4 Mg) 1 tab Q5M  PRN SL CHEST PAIN;  

Start 9/5/17 at 10:30


Acetaminophen (Tylenol Tab) 650 mg Q6H  PRN PO PAIN LEVEL 1-3 OR FEVER Last 

administered on 9/6/17at 01:49; Admin Dose 650 MG;  Start 9/5/17 at 10:30


Acetaminophen (Tylenol Supp) 650 mg Q6H  PRN FL PAIN LEVEL 1-3 OR FEVER;  Start 

9/5/17 at 10:30


Docusate Sodium (Colace) 100 mg Q12H  PRN PO CONSTIPATION;  Start 9/5/17 at 10:

30


Magnesium Hydroxide (Milk Of Mag) 30 ml DAILY  PRN PO CONSTIPATION;  Start 9/5/ 17 at 10:30


Bisacodyl (Dulcolax Supp) 10 mg DAILY  PRN FL CONSTIPATION;  Start 9/5/17 at 10:

30


Miscellaneous Information 1 ea NOTE XX ;  Start 9/5/17 at 10:30


Glucose (Glutose) 15 gm Q15M  PRN PO DECREASED GLUCOSE;  Start 9/5/17 at 10:30


Glucose (Glutose) 22.5 gm Q15M  PRN PO DECREASED GLUCOSE;  Start 9/5/17 at 10:30


Dextrose (D50w Syringe) 25 ml Q15M  PRN IV DECREASED GLUCOSE;  Start 9/5/17 at 

10:30


Dextrose (D50w Syringe) 50 ml Q15M  PRN IV DECREASED GLUCOSE;  Start 9/5/17 at 

10:30


Glucagon (Glucagen) 1 mg Q15M  PRN IM DECREASED GLUCOSE;  Start 9/5/17 at 10:30


Glucose (Glutose) 15 gm Q15M  PRN BUCCAL DECREASED GLUCOSE;  Start 9/5/17 at 10:

30


Pantoprazole (Protonix Tab) 40 mg DAILY@06 PO  Last administered on 9/9/17at 06:

45; Admin Dose 40 MG;  Start 9/7/17 at 06:00


Metoprolol Tartrate (Lopressor) 25 mg BID PO  Last administered on 9/9/17at 09:

22; Admin Dose 25 MG;  Start 9/6/17 at 21:00


Morphine Sulfate (morphine) 2 mg Q3  PRN IV PAIN LEVEL 7-10 Last administered 

on 9/9/17at 11:36; Admin Dose 2 MG;  Start 9/7/17 at 09:00


Nystatin 1 applic 1 applic BID TOP  Last administered on 9/9/17at 09:23; Admin 

Dose 1 APPLIC;  Start 9/7/17 at 21:00


Fluconazole/ Sodium Chloride (Diflucan 100 Mg/ NS (Pmx)) 50 ml @ 50 mls/hr Q24H 

IVPB  Last administered on 9/9/17at 11:35; Admin Dose 50 MLS/HR;  Start 9/8/17 

at 10:00











JOHAN MADRID MD Sep 9, 2017 12:30

## 2017-09-12 NOTE — CONS
DATE OF ADMISSION:  09/05/2017

 

DATE OF CONSULTATION:  09/09/2017

 

HISTORY OF PRESENT ILLNESS:  The patient was transferred to

[facility] from [facility] because

of the persistence of pseudocyst.  The patient initially was

admitted to [facility] for acute pancreatitis

and developed respiratory failure.  She was admitted to the

intensive care unit and she was intubated.  She had a

tracheostomy performed and the workup showed evidence of a

choledocholithiasis.  The patient had ERCP done and

choledocholithiasis was removed and CBD stent was placed.  Yamilet

also had a gastrojejunostomy tube placed for feeding purposes,

which seems to be working pretty good at this time.  She

developed a pseudocyst of the pancreas.  Initially a larger

pseudocyst collection was noted in the upper abdomen.

Percutaneous drainage was put in by radiologist and continued to

show evidence of a persistent drainage, however it was not

getting clear.  Because of this, the stent was placed into the

pancreatic duct to facilitate internal drainage.  And again, she

showed a left lower quadrant fluid collection, and this again was

felt to be drained, hence after discussing with radiologist and

the family physician,  __________, the drainage of the left

lower quadrant also was performed.  No drainage tube left behind.

The stent percutaneously put up in the upper abdomen continues to

show evidence of persistent fluid collection and drainage is

continuously ongoing.  She has been treated with antibiotics.

 

PHYSICAL EXAMINATION:

GENERAL:  Patient appears well.  She is status post tracheostomy

now, which has been the case for a while.

VITAL SIGNS:  Blood pressure is 134/82, pulse is 67, temperature

is 98.

HEART:  Unremarkable.

LUNGS:  Unremarkable.

ABDOMEN:  Showed evidence of soft abdomen with a G-J tube working

well and she is tolerating the G-tube feeding well.  Secondly,

she has evidence of catheter draining at least 20 to 30 cc of

pancreatic juice every day.

 

IMPRESSION:  Persistent pancreatitis with a pancreatic

pseudocyst, not clearing despite percutaneous drainage and

endoscopic drainage into the duodenum.

 

PLAN:  At this time, the pancreatic surgery consultation was

obtained and we are awaiting further advice.

 

 

 

Dictated By:  Bon Strong MD

 

/grant/dejon

Job#:  [id number]/Document#:  [id number]

D:  09/09/2017 13:38

T:  09/09/2017 15:01

## 2018-07-13 ENCOUNTER — HOSPITAL ENCOUNTER (INPATIENT)
Dept: HOSPITAL 91 - E/R | Age: 83
LOS: 11 days | Discharge: NURSING, REHAB OR LONG-TERM CARE | DRG: 853 | End: 2018-07-24
Payer: MEDICARE

## 2018-07-13 ENCOUNTER — HOSPITAL ENCOUNTER (INPATIENT)
Age: 83
LOS: 11 days | Discharge: NURSING, REHAB OR LONG-TERM CARE | DRG: 853 | End: 2018-07-24

## 2018-07-13 DIAGNOSIS — I38: ICD-10-CM

## 2018-07-13 DIAGNOSIS — F03.90: ICD-10-CM

## 2018-07-13 DIAGNOSIS — Z66: ICD-10-CM

## 2018-07-13 DIAGNOSIS — F05: ICD-10-CM

## 2018-07-13 DIAGNOSIS — I48.2: ICD-10-CM

## 2018-07-13 DIAGNOSIS — L89.154: ICD-10-CM

## 2018-07-13 DIAGNOSIS — I35.1: ICD-10-CM

## 2018-07-13 DIAGNOSIS — E11.9: ICD-10-CM

## 2018-07-13 DIAGNOSIS — N17.9: ICD-10-CM

## 2018-07-13 DIAGNOSIS — Z95.1: ICD-10-CM

## 2018-07-13 DIAGNOSIS — R31.0: ICD-10-CM

## 2018-07-13 DIAGNOSIS — A41.02: Primary | ICD-10-CM

## 2018-07-13 DIAGNOSIS — R13.10: ICD-10-CM

## 2018-07-13 DIAGNOSIS — R65.20: ICD-10-CM

## 2018-07-13 DIAGNOSIS — J18.9: ICD-10-CM

## 2018-07-13 LAB
ABNORMAL IP MESSAGE: 1
ADD MAN DIFF?: NO
ALANINE AMINOTRANSFERASE: 147 IU/L (ref 13–69)
ALBUMIN/GLOBULIN RATIO: 0.78
ALBUMIN: 3.3 G/DL (ref 3.3–4.9)
ALKALINE PHOSPHATASE: 86 IU/L (ref 42–121)
ANION GAP: 17 (ref 8–16)
ASPARTATE AMINO TRANSFERASE: 120 IU/L (ref 15–46)
BASOPHIL #: 0.1 10^3/UL (ref 0–0.1)
BASOPHILS %: 0.3 % (ref 0–2)
BILIRUBIN,DIRECT: 0 MG/DL (ref 0–0.2)
BILIRUBIN,TOTAL: 0.4 MG/DL (ref 0.2–1.3)
BLOOD UREA NITROGEN: 60 MG/DL (ref 7–20)
CALCIUM: 8.7 MG/DL (ref 8.4–10.2)
CARBON DIOXIDE: 15 MMOL/L (ref 21–31)
CHLORIDE: 114 MMOL/L (ref 97–110)
CREATININE: 1.28 MG/DL (ref 0.44–1)
DIGOXIN: 0.6 NG/ML (ref 1–2)
EOSINOPHILS #: 0 10^3/UL (ref 0–0.5)
EOSINOPHILS %: 0 % (ref 0–7)
GLOBULIN: 4.2 G/DL (ref 1.3–3.2)
GLUCOSE: 301 MG/DL (ref 70–220)
HEMATOCRIT: 35.3 % (ref 37–47)
HEMOGLOBIN A1C: 6.2 % (ref 0–5.9)
HEMOGLOBIN: 11.2 G/DL (ref 12–16)
INR: 1.2
LACTIC ACID: 1.1 MMOL/L (ref 0.5–2)
LACTIC ACID: 1.2 MMOL/L (ref 0.5–2)
LACTIC ACID: 2 MMOL/L (ref 0.5–2)
LYMPHOCYTES #: 0.8 10^3/UL (ref 0.8–2.9)
LYMPHOCYTES %: 3.6 % (ref 15–51)
MEAN CORPUSCULAR HEMOGLOBIN: 26.5 PG (ref 29–33)
MEAN CORPUSCULAR HGB CONC: 31.7 G/DL (ref 32–37)
MEAN CORPUSCULAR VOLUME: 83.5 FL (ref 82–101)
MEAN PLATELET VOLUME: 9 FL (ref 7.4–10.4)
MONOCYTE #: 0.7 10^3/UL (ref 0.3–0.9)
MONOCYTES %: 3.1 % (ref 0–11)
NEUTROPHIL #: 20.8 10^3/UL (ref 1.6–7.5)
NEUTROPHILS %: 91.6 % (ref 39–77)
NUCLEATED RED BLOOD CELLS #: 0 10^3/UL (ref 0–0)
NUCLEATED RED BLOOD CELLS%: 0 /100WBC (ref 0–0)
PARTIAL THROMBOPLASTIN TIME: 28.1 SEC (ref 25–35)
PLATELET COUNT: 324 10^3/UL (ref 140–415)
POSITIVE DIFF: (no result)
POTASSIUM: 4.9 MMOL/L (ref 3.5–5.1)
PROTIME: 15.4 SEC (ref 11.9–14.9)
PT RATIO: 1.2
RED BLOOD COUNT: 4.23 10^6/UL (ref 4.2–5.4)
RED CELL DISTRIBUTION WIDTH: 14.3 % (ref 11.5–14.5)
SODIUM: 141 MMOL/L (ref 135–144)
TOTAL PROTEIN: 7.5 G/DL (ref 6.1–8.1)
TROPONIN-I: 0.03 NG/ML (ref 0–0.12)
WHITE BLOOD COUNT: 22.7 10^3/UL (ref 4.8–10.8)

## 2018-07-13 PROCEDURE — 80162 ASSAY OF DIGOXIN TOTAL: CPT

## 2018-07-13 PROCEDURE — 80069 RENAL FUNCTION PANEL: CPT

## 2018-07-13 PROCEDURE — 80048 BASIC METABOLIC PNL TOTAL CA: CPT

## 2018-07-13 PROCEDURE — 80202 ASSAY OF VANCOMYCIN: CPT

## 2018-07-13 PROCEDURE — 85730 THROMBOPLASTIN TIME PARTIAL: CPT

## 2018-07-13 PROCEDURE — 93306 TTE W/DOPPLER COMPLETE: CPT

## 2018-07-13 PROCEDURE — 87040 BLOOD CULTURE FOR BACTERIA: CPT

## 2018-07-13 PROCEDURE — 86592 SYPHILIS TEST NON-TREP QUAL: CPT

## 2018-07-13 PROCEDURE — 82746 ASSAY OF FOLIC ACID SERUM: CPT

## 2018-07-13 PROCEDURE — 84443 ASSAY THYROID STIM HORMONE: CPT

## 2018-07-13 PROCEDURE — 82607 VITAMIN B-12: CPT

## 2018-07-13 PROCEDURE — 88104 CYTOPATH FL NONGYN SMEARS: CPT

## 2018-07-13 PROCEDURE — 96375 TX/PRO/DX INJ NEW DRUG ADDON: CPT

## 2018-07-13 PROCEDURE — 71045 X-RAY EXAM CHEST 1 VIEW: CPT

## 2018-07-13 PROCEDURE — 87086 URINE CULTURE/COLONY COUNT: CPT

## 2018-07-13 PROCEDURE — 84484 ASSAY OF TROPONIN QUANT: CPT

## 2018-07-13 PROCEDURE — 94640 AIRWAY INHALATION TREATMENT: CPT

## 2018-07-13 PROCEDURE — 93005 ELECTROCARDIOGRAM TRACING: CPT

## 2018-07-13 PROCEDURE — 94664 DEMO&/EVAL PT USE INHALER: CPT

## 2018-07-13 PROCEDURE — 76937 US GUIDE VASCULAR ACCESS: CPT

## 2018-07-13 PROCEDURE — 84100 ASSAY OF PHOSPHORUS: CPT

## 2018-07-13 PROCEDURE — 96374 THER/PROPH/DIAG INJ IV PUSH: CPT

## 2018-07-13 PROCEDURE — 85025 COMPLETE CBC W/AUTO DIFF WBC: CPT

## 2018-07-13 PROCEDURE — 36569 INSJ PICC 5 YR+ W/O IMAGING: CPT

## 2018-07-13 PROCEDURE — 88304 TISSUE EXAM BY PATHOLOGIST: CPT

## 2018-07-13 PROCEDURE — 83036 HEMOGLOBIN GLYCOSYLATED A1C: CPT

## 2018-07-13 PROCEDURE — 85610 PROTHROMBIN TIME: CPT

## 2018-07-13 PROCEDURE — 99291 CRITICAL CARE FIRST HOUR: CPT

## 2018-07-13 PROCEDURE — 93971 EXTREMITY STUDY: CPT

## 2018-07-13 PROCEDURE — 92526 ORAL FUNCTION THERAPY: CPT

## 2018-07-13 PROCEDURE — 83605 ASSAY OF LACTIC ACID: CPT

## 2018-07-13 PROCEDURE — 82962 GLUCOSE BLOOD TEST: CPT

## 2018-07-13 PROCEDURE — 92610 EVALUATE SWALLOWING FUNCTION: CPT

## 2018-07-13 PROCEDURE — 83735 ASSAY OF MAGNESIUM: CPT

## 2018-07-13 PROCEDURE — 80053 COMPREHEN METABOLIC PANEL: CPT

## 2018-07-13 RX ADMIN — LIDOCAINE HYDROCHLORIDE 1 ML: 10 INJECTION, SOLUTION EPIDURAL; INFILTRATION; INTRACAUDAL; PERINEURAL at 16:00

## 2018-07-13 RX ADMIN — PREGABALIN 1 MG: 50 CAPSULE ORAL at 21:00

## 2018-07-13 RX ADMIN — PIPERACILLIN SODIUM AND TAZOBACTAM SODIUM 1 MLS/HR: 3; .375 INJECTION, POWDER, LYOPHILIZED, FOR SOLUTION INTRAVENOUS at 12:55

## 2018-07-13 RX ADMIN — MIDODRINE HYDROCHLORIDE 1 MG: 5 TABLET ORAL at 17:30

## 2018-07-13 RX ADMIN — INSULIN ASPART 1 UNIT: 100 INJECTION, SOLUTION INTRAVENOUS; SUBCUTANEOUS at 21:00

## 2018-07-13 RX ADMIN — QUETIAPINE FUMARATE 1 MG: 25 TABLET ORAL at 21:00

## 2018-07-13 RX ADMIN — SERTRALINE HYDROCHLORIDE 1 MG: 50 TABLET ORAL at 21:00

## 2018-07-13 RX ADMIN — VANCOMYCIN HYDROCHLORIDE 1 MLS/HR: 1 INJECTION, POWDER, LYOPHILIZED, FOR SOLUTION INTRAVENOUS at 12:15

## 2018-07-13 RX ADMIN — THIAMINE HYDROCHLORIDE 1 ML: 100 INJECTION, SOLUTION INTRAMUSCULAR; INTRAVENOUS at 12:55

## 2018-07-13 RX ADMIN — THIAMINE HYDROCHLORIDE 1 MLS/HR: 100 INJECTION, SOLUTION INTRAMUSCULAR; INTRAVENOUS at 14:45

## 2018-07-13 RX ADMIN — ACETAMINOPHEN 1 MG: 650 SUPPOSITORY RECTAL at 12:55

## 2018-07-13 RX ADMIN — METOPROLOL TARTRATE 1 MG: 25 TABLET ORAL at 21:00

## 2018-07-13 RX ADMIN — HYDROCORTISONE SODIUM SUCCINATE 1 MG: 100 INJECTION, POWDER, FOR SOLUTION INTRAMUSCULAR; INTRAVENOUS at 15:26

## 2018-07-13 RX ADMIN — MIDODRINE HYDROCHLORIDE 1 MG: 5 TABLET ORAL at 21:00

## 2018-07-13 RX ADMIN — THIAMINE HYDROCHLORIDE 1 MLS/HR: 100 INJECTION, SOLUTION INTRAMUSCULAR; INTRAVENOUS at 19:04

## 2018-07-14 LAB
ADD MAN DIFF?: NO
ALBUMIN: 2.3 G/DL (ref 3.3–4.9)
ANION GAP: 14 (ref 8–16)
BASOPHIL #: 0 10^3/UL (ref 0–0.1)
BASOPHILS %: 0.2 % (ref 0–2)
BLOOD UREA NITROGEN: 53 MG/DL (ref 7–20)
CALCIUM: 8 MG/DL (ref 8.4–10.2)
CARBON DIOXIDE: 15 MMOL/L (ref 21–31)
CHLORIDE: 122 MMOL/L (ref 97–110)
CREATININE: 1.14 MG/DL (ref 0.44–1)
EOSINOPHILS #: 0 10^3/UL (ref 0–0.5)
EOSINOPHILS %: 0.4 % (ref 0–7)
GLUCOSE: 142 MG/DL (ref 70–220)
HEMATOCRIT: 28.4 % (ref 37–47)
HEMOGLOBIN: 9 G/DL (ref 12–16)
LYMPHOCYTES #: 0.9 10^3/UL (ref 0.8–2.9)
LYMPHOCYTES %: 8.4 % (ref 15–51)
MAGNESIUM: 2 MG/DL (ref 1.7–2.5)
MEAN CORPUSCULAR HEMOGLOBIN: 26.8 PG (ref 29–33)
MEAN CORPUSCULAR HGB CONC: 31.7 G/DL (ref 32–37)
MEAN CORPUSCULAR VOLUME: 84.5 FL (ref 82–101)
MEAN PLATELET VOLUME: 9.5 FL (ref 7.4–10.4)
MONOCYTE #: 0.7 10^3/UL (ref 0.3–0.9)
MONOCYTES %: 6.6 % (ref 0–11)
NEUTROPHIL #: 9 10^3/UL (ref 1.6–7.5)
NEUTROPHILS %: 83.5 % (ref 39–77)
NUCLEATED RED BLOOD CELLS #: 0 10^3/UL (ref 0–0)
NUCLEATED RED BLOOD CELLS%: 0 /100WBC (ref 0–0)
PHOSPHORUS: 3 MG/DL (ref 2.5–4.9)
PLATELET COUNT: 228 10^3/UL (ref 140–415)
POTASSIUM: 4.1 MMOL/L (ref 3.5–5.1)
RED BLOOD COUNT: 3.36 10^6/UL (ref 4.2–5.4)
RED CELL DISTRIBUTION WIDTH: 14.5 % (ref 11.5–14.5)
SODIUM: 147 MMOL/L (ref 135–144)
WHITE BLOOD COUNT: 10.8 10^3/UL (ref 4.8–10.8)

## 2018-07-14 RX ADMIN — DIGOXIN 1 MG: 125 TABLET ORAL at 12:59

## 2018-07-14 RX ADMIN — NYSTATIN 1 APPLIC: 100000 CREAM TOPICAL at 09:10

## 2018-07-14 RX ADMIN — INSULIN ASPART 1 UNIT: 100 INJECTION, SOLUTION INTRAVENOUS; SUBCUTANEOUS at 21:00

## 2018-07-14 RX ADMIN — PREGABALIN 1 MG: 50 CAPSULE ORAL at 09:20

## 2018-07-14 RX ADMIN — VANCOMYCIN HYDROCHLORIDE 1 MLS/HR: 1 INJECTION, POWDER, LYOPHILIZED, FOR SOLUTION INTRAVENOUS at 12:54

## 2018-07-14 RX ADMIN — VITAMIN C 1 CAP: TAB at 09:10

## 2018-07-14 RX ADMIN — NYSTATIN 1 APPLIC: 100000 CREAM TOPICAL at 02:48

## 2018-07-14 RX ADMIN — PIPERACILLIN AND TAZOBACTAM 1 MLS/HR: 2; .25 INJECTION, POWDER, FOR SOLUTION INTRAVENOUS at 02:47

## 2018-07-14 RX ADMIN — MULTIPLE VITAMINS W/ MINERALS TAB 1 TAB: TAB at 09:10

## 2018-07-14 RX ADMIN — PIPERACILLIN AND TAZOBACTAM 1 MLS/HR: 2; .25 INJECTION, POWDER, FOR SOLUTION INTRAVENOUS at 05:52

## 2018-07-14 RX ADMIN — INSULIN ASPART 1 UNIT: 100 INJECTION, SOLUTION INTRAVENOUS; SUBCUTANEOUS at 12:15

## 2018-07-14 RX ADMIN — INSULIN ASPART 1 UNIT: 100 INJECTION, SOLUTION INTRAVENOUS; SUBCUTANEOUS at 17:59

## 2018-07-14 RX ADMIN — PIPERACILLIN AND TAZOBACTAM 1 MLS/HR: 2; .25 INJECTION, POWDER, FOR SOLUTION INTRAVENOUS at 15:43

## 2018-07-14 RX ADMIN — MIDODRINE HYDROCHLORIDE 1 MG: 5 TABLET ORAL at 21:22

## 2018-07-14 RX ADMIN — ASPIRIN 1 MG: 81 TABLET, COATED ORAL at 09:04

## 2018-07-14 RX ADMIN — METOPROLOL TARTRATE 1 MG: 25 TABLET ORAL at 09:08

## 2018-07-14 RX ADMIN — THIAMINE HYDROCHLORIDE 1 MLS/HR: 100 INJECTION, SOLUTION INTRAMUSCULAR; INTRAVENOUS at 03:32

## 2018-07-14 RX ADMIN — CLOPIDOGREL 1 MG: 75 TABLET, FILM COATED ORAL at 09:04

## 2018-07-14 RX ADMIN — POTASSIUM CHLORIDE 1 MEQ: 10 TABLET, EXTENDED RELEASE ORAL at 09:04

## 2018-07-14 RX ADMIN — NYSTATIN 1 APPLIC: 100000 CREAM TOPICAL at 21:23

## 2018-07-14 RX ADMIN — SERTRALINE HYDROCHLORIDE 1 MG: 50 TABLET ORAL at 21:22

## 2018-07-14 RX ADMIN — MIDODRINE HYDROCHLORIDE 1 MG: 5 TABLET ORAL at 09:09

## 2018-07-14 RX ADMIN — PANTOPRAZOLE SODIUM 1 MG: 40 TABLET, DELAYED RELEASE ORAL at 09:04

## 2018-07-14 RX ADMIN — THIAMINE HYDROCHLORIDE 1 MLS/HR: 100 INJECTION, SOLUTION INTRAMUSCULAR; INTRAVENOUS at 15:38

## 2018-07-14 RX ADMIN — PIPERACILLIN AND TAZOBACTAM 1 MLS/HR: 2; .25 INJECTION, POWDER, FOR SOLUTION INTRAVENOUS at 21:42

## 2018-07-14 RX ADMIN — PREGABALIN 1 MG: 50 CAPSULE ORAL at 21:22

## 2018-07-14 RX ADMIN — FERROUS SULFATE TAB 325 MG (65 MG ELEMENTAL FE) 1 MG: 325 (65 FE) TAB at 09:09

## 2018-07-14 RX ADMIN — QUETIAPINE FUMARATE 1 MG: 25 TABLET ORAL at 21:21

## 2018-07-14 RX ADMIN — LORATADINE 1 MG: 10 TABLET ORAL at 09:09

## 2018-07-14 RX ADMIN — METOPROLOL TARTRATE 1 MG: 25 TABLET ORAL at 21:21

## 2018-07-14 RX ADMIN — INSULIN ASPART 1 UNIT: 100 INJECTION, SOLUTION INTRAVENOUS; SUBCUTANEOUS at 08:15

## 2018-07-14 RX ADMIN — MIDODRINE HYDROCHLORIDE 1 MG: 5 TABLET ORAL at 12:56

## 2018-07-15 LAB
ADD MAN DIFF?: NO
ALBUMIN: 2.8 G/DL (ref 3.3–4.9)
ANION GAP: 14 (ref 8–16)
BASOPHIL #: 0 10^3/UL (ref 0–0.1)
BASOPHILS %: 0.2 % (ref 0–2)
BLOOD UREA NITROGEN: 41 MG/DL (ref 7–20)
CALCIUM: 8.3 MG/DL (ref 8.4–10.2)
CARBON DIOXIDE: 15 MMOL/L (ref 21–31)
CHLORIDE: 120 MMOL/L (ref 97–110)
CREATININE: 1.14 MG/DL (ref 0.44–1)
EOSINOPHILS #: 0.3 10^3/UL (ref 0–0.5)
EOSINOPHILS %: 2.3 % (ref 0–7)
GLUCOSE: 85 MG/DL (ref 70–220)
HEMATOCRIT: 29.5 % (ref 37–47)
HEMOGLOBIN: 9.3 G/DL (ref 12–16)
LYMPHOCYTES #: 1 10^3/UL (ref 0.8–2.9)
LYMPHOCYTES %: 8.8 % (ref 15–51)
MAGNESIUM: 2 MG/DL (ref 1.7–2.5)
MEAN CORPUSCULAR HEMOGLOBIN: 26.2 PG (ref 29–33)
MEAN CORPUSCULAR HGB CONC: 31.5 G/DL (ref 32–37)
MEAN CORPUSCULAR VOLUME: 83.1 FL (ref 82–101)
MEAN PLATELET VOLUME: 9.2 FL (ref 7.4–10.4)
MONOCYTE #: 0.7 10^3/UL (ref 0.3–0.9)
MONOCYTES %: 6.4 % (ref 0–11)
NEUTROPHIL #: 8.7 10^3/UL (ref 1.6–7.5)
NEUTROPHILS %: 80.3 % (ref 39–77)
NUCLEATED RED BLOOD CELLS #: 0 10^3/UL (ref 0–0)
NUCLEATED RED BLOOD CELLS%: 0 /100WBC (ref 0–0)
PHOSPHORUS: 2.9 MG/DL (ref 2.5–4.9)
PLATELET COUNT: 258 10^3/UL (ref 140–415)
POTASSIUM: 3.8 MMOL/L (ref 3.5–5.1)
RED BLOOD COUNT: 3.55 10^6/UL (ref 4.2–5.4)
RED CELL DISTRIBUTION WIDTH: 14.6 % (ref 11.5–14.5)
SODIUM: 145 MMOL/L (ref 135–144)
WHITE BLOOD COUNT: 10.8 10^3/UL (ref 4.8–10.8)

## 2018-07-15 RX ADMIN — INSULIN ASPART 1 UNIT: 100 INJECTION, SOLUTION INTRAVENOUS; SUBCUTANEOUS at 18:00

## 2018-07-15 RX ADMIN — POTASSIUM CHLORIDE 1 MEQ: 10 TABLET, EXTENDED RELEASE ORAL at 09:22

## 2018-07-15 RX ADMIN — METOPROLOL TARTRATE 1 MG: 25 TABLET ORAL at 09:22

## 2018-07-15 RX ADMIN — NYSTATIN 1 APPLIC: 100000 CREAM TOPICAL at 21:01

## 2018-07-15 RX ADMIN — VITAMIN C 1 CAP: TAB at 09:22

## 2018-07-15 RX ADMIN — CLOPIDOGREL 1 MG: 75 TABLET, FILM COATED ORAL at 09:21

## 2018-07-15 RX ADMIN — PIPERACILLIN AND TAZOBACTAM 1 MLS/HR: 2; .25 INJECTION, POWDER, FOR SOLUTION INTRAVENOUS at 21:32

## 2018-07-15 RX ADMIN — MIDODRINE HYDROCHLORIDE 1 MG: 5 TABLET ORAL at 13:00

## 2018-07-15 RX ADMIN — INSULIN ASPART 1 UNIT: 100 INJECTION, SOLUTION INTRAVENOUS; SUBCUTANEOUS at 08:02

## 2018-07-15 RX ADMIN — MIDODRINE HYDROCHLORIDE 1 MG: 5 TABLET ORAL at 09:21

## 2018-07-15 RX ADMIN — VANCOMYCIN HYDROCHLORIDE 1 MLS/HR: 1 INJECTION, POWDER, LYOPHILIZED, FOR SOLUTION INTRAVENOUS at 12:40

## 2018-07-15 RX ADMIN — ALBUTEROL SULFATE 1 MG: 2.5 SOLUTION RESPIRATORY (INHALATION) at 19:56

## 2018-07-15 RX ADMIN — PIPERACILLIN AND TAZOBACTAM 1 MLS/HR: 2; .25 INJECTION, POWDER, FOR SOLUTION INTRAVENOUS at 05:42

## 2018-07-15 RX ADMIN — PREGABALIN 1 MG: 50 CAPSULE ORAL at 09:22

## 2018-07-15 RX ADMIN — INSULIN ASPART 1 UNIT: 100 INJECTION, SOLUTION INTRAVENOUS; SUBCUTANEOUS at 12:15

## 2018-07-15 RX ADMIN — PANTOPRAZOLE SODIUM 1 MG: 40 TABLET, DELAYED RELEASE ORAL at 09:21

## 2018-07-15 RX ADMIN — THIAMINE HYDROCHLORIDE 1 MLS/HR: 100 INJECTION, SOLUTION INTRAMUSCULAR; INTRAVENOUS at 05:42

## 2018-07-15 RX ADMIN — THIAMINE HYDROCHLORIDE 1 MLS/HR: 100 INJECTION, SOLUTION INTRAMUSCULAR; INTRAVENOUS at 02:19

## 2018-07-15 RX ADMIN — ALBUTEROL SULFATE 1 MG: 2.5 SOLUTION RESPIRATORY (INHALATION) at 14:17

## 2018-07-15 RX ADMIN — NYSTATIN 1 APPLIC: 100000 CREAM TOPICAL at 09:22

## 2018-07-15 RX ADMIN — COLLAGENASE SANTYL 1 APPLIC: 250 OINTMENT TOPICAL at 09:22

## 2018-07-15 RX ADMIN — LORATADINE 1 MG: 10 TABLET ORAL at 09:21

## 2018-07-15 RX ADMIN — MIDODRINE HYDROCHLORIDE 1 MG: 5 TABLET ORAL at 20:52

## 2018-07-15 RX ADMIN — QUETIAPINE FUMARATE 1 MG: 25 TABLET ORAL at 20:51

## 2018-07-15 RX ADMIN — INSULIN ASPART 1 UNIT: 100 INJECTION, SOLUTION INTRAVENOUS; SUBCUTANEOUS at 21:00

## 2018-07-15 RX ADMIN — PIPERACILLIN AND TAZOBACTAM 1 MLS/HR: 2; .25 INJECTION, POWDER, FOR SOLUTION INTRAVENOUS at 14:40

## 2018-07-15 RX ADMIN — FERROUS SULFATE TAB 325 MG (65 MG ELEMENTAL FE) 1 MG: 325 (65 FE) TAB at 09:22

## 2018-07-15 RX ADMIN — SERTRALINE HYDROCHLORIDE 1 MG: 50 TABLET ORAL at 20:51

## 2018-07-15 RX ADMIN — MULTIPLE VITAMINS W/ MINERALS TAB 1 TAB: TAB at 09:21

## 2018-07-15 RX ADMIN — ASPIRIN 1 MG: 81 TABLET, COATED ORAL at 09:22

## 2018-07-15 RX ADMIN — PREGABALIN 1 MG: 50 CAPSULE ORAL at 20:54

## 2018-07-15 RX ADMIN — METOPROLOL TARTRATE 1 MG: 25 TABLET ORAL at 21:32

## 2018-07-16 LAB
ADD MAN DIFF?: NO
ALBUMIN: 2.9 G/DL (ref 3.3–4.9)
ANION GAP: 12 (ref 8–16)
BASOPHIL #: 0 10^3/UL (ref 0–0.1)
BASOPHILS %: 0.1 % (ref 0–2)
BLOOD UREA NITROGEN: 30 MG/DL (ref 7–20)
CALCIUM: 8.4 MG/DL (ref 8.4–10.2)
CARBON DIOXIDE: 16 MMOL/L (ref 21–31)
CHLORIDE: 121 MMOL/L (ref 97–110)
CREATININE: 1.04 MG/DL (ref 0.44–1)
EOSINOPHILS #: 0.1 10^3/UL (ref 0–0.5)
EOSINOPHILS %: 0.8 % (ref 0–7)
GLUCOSE: 134 MG/DL (ref 70–220)
HEMATOCRIT: 29.9 % (ref 37–47)
HEMOGLOBIN: 9.5 G/DL (ref 12–16)
LYMPHOCYTES #: 1.1 10^3/UL (ref 0.8–2.9)
LYMPHOCYTES %: 7.7 % (ref 15–51)
MAGNESIUM: 1.9 MG/DL (ref 1.7–2.5)
MEAN CORPUSCULAR HEMOGLOBIN: 26.2 PG (ref 29–33)
MEAN CORPUSCULAR HGB CONC: 31.8 G/DL (ref 32–37)
MEAN CORPUSCULAR VOLUME: 82.6 FL (ref 82–101)
MEAN PLATELET VOLUME: 9.1 FL (ref 7.4–10.4)
MONOCYTE #: 0.8 10^3/UL (ref 0.3–0.9)
MONOCYTES %: 6.1 % (ref 0–11)
NEUTROPHIL #: 11.4 10^3/UL (ref 1.6–7.5)
NEUTROPHILS %: 83.7 % (ref 39–77)
NUCLEATED RED BLOOD CELLS #: 0 10^3/UL (ref 0–0)
NUCLEATED RED BLOOD CELLS%: 0 /100WBC (ref 0–0)
PHOSPHORUS: 3.4 MG/DL (ref 2.5–4.9)
PLATELET COUNT: 246 10^3/UL (ref 140–415)
POTASSIUM: 3.6 MMOL/L (ref 3.5–5.1)
RED BLOOD COUNT: 3.62 10^6/UL (ref 4.2–5.4)
RED CELL DISTRIBUTION WIDTH: 14.7 % (ref 11.5–14.5)
SODIUM: 145 MMOL/L (ref 135–144)
VANCOMYCIN,TROUGH: 12.8 UG/ML (ref 10–20)
WHITE BLOOD COUNT: 13.7 10^3/UL (ref 4.8–10.8)

## 2018-07-16 RX ADMIN — INSULIN ASPART 1 UNIT: 100 INJECTION, SOLUTION INTRAVENOUS; SUBCUTANEOUS at 17:57

## 2018-07-16 RX ADMIN — COLLAGENASE SANTYL 1 APPLIC: 250 OINTMENT TOPICAL at 08:43

## 2018-07-16 RX ADMIN — DOCUSATE SODIUM 1 MG: 100 CAPSULE, LIQUID FILLED ORAL at 21:11

## 2018-07-16 RX ADMIN — NYSTATIN 1 APPLIC: 100000 CREAM TOPICAL at 08:44

## 2018-07-16 RX ADMIN — PANTOPRAZOLE SODIUM 1 MG: 40 TABLET, DELAYED RELEASE ORAL at 08:56

## 2018-07-16 RX ADMIN — SERTRALINE HYDROCHLORIDE 1 MG: 50 TABLET ORAL at 21:05

## 2018-07-16 RX ADMIN — MIDODRINE HYDROCHLORIDE 1 MG: 5 TABLET ORAL at 08:55

## 2018-07-16 RX ADMIN — METOPROLOL TARTRATE 1 MG: 25 TABLET ORAL at 08:56

## 2018-07-16 RX ADMIN — CLOPIDOGREL 1 MG: 75 TABLET, FILM COATED ORAL at 08:44

## 2018-07-16 RX ADMIN — VITAMIN C 1 CAP: TAB at 08:44

## 2018-07-16 RX ADMIN — PREGABALIN 1 MG: 50 CAPSULE ORAL at 21:03

## 2018-07-16 RX ADMIN — NYSTATIN 1 APPLIC: 100000 CREAM TOPICAL at 21:12

## 2018-07-16 RX ADMIN — ASCORBIC ACID, VITAMIN A PALMITATE, CHOLECALCIFEROL, THIAMINE HYDROCHLORIDE, RIBOFLAVIN-5 PHOSPHATE SODIUM, PYRIDOXINE HYDROCHLORIDE, NIACINAMIDE, DEXPANTHENOL, ALPHA-TOCOPHEROL ACETATE, VITAMIN K1, FOLIC ACID, BIOTIN, CYANOCOBALAMIN 1 MLS/HR: 200; 3300; 200; 6; 3.6; 6; 40; 15; 10; 150; 600; 60; 5 INJECTION, SOLUTION INTRAVENOUS at 10:16

## 2018-07-16 RX ADMIN — ACETAMINOPHEN 1 MG: 325 TABLET, FILM COATED ORAL at 02:31

## 2018-07-16 RX ADMIN — MIDODRINE HYDROCHLORIDE 1 MG: 5 TABLET ORAL at 21:06

## 2018-07-16 RX ADMIN — PIPERACILLIN AND TAZOBACTAM 1 MLS/HR: 2; .25 INJECTION, POWDER, FOR SOLUTION INTRAVENOUS at 06:24

## 2018-07-16 RX ADMIN — POTASSIUM CHLORIDE 1 MEQ: 10 TABLET, EXTENDED RELEASE ORAL at 08:44

## 2018-07-16 RX ADMIN — INSULIN ASPART 1 UNIT: 100 INJECTION, SOLUTION INTRAVENOUS; SUBCUTANEOUS at 21:53

## 2018-07-16 RX ADMIN — LORATADINE 1 MG: 10 TABLET ORAL at 08:44

## 2018-07-16 RX ADMIN — PREGABALIN 1 MG: 50 CAPSULE ORAL at 08:44

## 2018-07-16 RX ADMIN — SODIUM CHLORIDE 1 APPLIC: 0.9 IRRIGANT IRRIGATION at 21:13

## 2018-07-16 RX ADMIN — INSULIN ASPART 1 UNIT: 100 INJECTION, SOLUTION INTRAVENOUS; SUBCUTANEOUS at 12:38

## 2018-07-16 RX ADMIN — QUETIAPINE FUMARATE 1 MG: 25 TABLET ORAL at 21:03

## 2018-07-16 RX ADMIN — PIPERACILLIN AND TAZOBACTAM 1 MLS/HR: 2; .25 INJECTION, POWDER, FOR SOLUTION INTRAVENOUS at 21:11

## 2018-07-16 RX ADMIN — METOPROLOL TARTRATE 1 MG: 25 TABLET ORAL at 21:04

## 2018-07-16 RX ADMIN — FERROUS SULFATE TAB 325 MG (65 MG ELEMENTAL FE) 1 MG: 325 (65 FE) TAB at 08:44

## 2018-07-16 RX ADMIN — MIDODRINE HYDROCHLORIDE 1 MG: 5 TABLET ORAL at 12:31

## 2018-07-16 RX ADMIN — VANCOMYCIN HYDROCHLORIDE 1 MLS/HR: 1 INJECTION, POWDER, LYOPHILIZED, FOR SOLUTION INTRAVENOUS at 13:23

## 2018-07-16 RX ADMIN — PIPERACILLIN AND TAZOBACTAM 1 MLS/HR: 2; .25 INJECTION, POWDER, FOR SOLUTION INTRAVENOUS at 16:55

## 2018-07-16 RX ADMIN — ASPIRIN 1 MG: 81 TABLET, COATED ORAL at 08:44

## 2018-07-16 RX ADMIN — MULTIPLE VITAMINS W/ MINERALS TAB 1 TAB: TAB at 08:55

## 2018-07-16 RX ADMIN — DIGOXIN 1 MG: 125 TABLET ORAL at 12:35

## 2018-07-16 RX ADMIN — CASTOR OIL AND BALSAM, PERU 1 APPLIC: 788; 87 OINTMENT TOPICAL at 21:03

## 2018-07-16 RX ADMIN — INSULIN ASPART 1 UNIT: 100 INJECTION, SOLUTION INTRAVENOUS; SUBCUTANEOUS at 08:15

## 2018-07-17 LAB
ADD MAN DIFF?: NO
ANION GAP: 14 (ref 8–16)
BASOPHIL #: 0 10^3/UL (ref 0–0.1)
BASOPHILS %: 0.2 % (ref 0–2)
BLOOD UREA NITROGEN: 25 MG/DL (ref 7–20)
CALCIUM: 8.3 MG/DL (ref 8.4–10.2)
CARBON DIOXIDE: 17 MMOL/L (ref 21–31)
CHLORIDE: 117 MMOL/L (ref 97–110)
CREATININE: 1.04 MG/DL (ref 0.44–1)
EOSINOPHILS #: 0.4 10^3/UL (ref 0–0.5)
EOSINOPHILS %: 2.5 % (ref 0–7)
GLUCOSE: 141 MG/DL (ref 70–220)
HEMATOCRIT: 30.3 % (ref 37–47)
HEMOGLOBIN: 9.6 G/DL (ref 12–16)
LYMPHOCYTES #: 1.5 10^3/UL (ref 0.8–2.9)
LYMPHOCYTES %: 10 % (ref 15–51)
MAGNESIUM: 1.9 MG/DL (ref 1.7–2.5)
MEAN CORPUSCULAR HEMOGLOBIN: 26.4 PG (ref 29–33)
MEAN CORPUSCULAR HGB CONC: 31.7 G/DL (ref 32–37)
MEAN CORPUSCULAR VOLUME: 83.5 FL (ref 82–101)
MEAN PLATELET VOLUME: 9 FL (ref 7.4–10.4)
MONOCYTE #: 1 10^3/UL (ref 0.3–0.9)
MONOCYTES %: 6.8 % (ref 0–11)
NEUTROPHIL #: 11.6 10^3/UL (ref 1.6–7.5)
NEUTROPHILS %: 78.2 % (ref 39–77)
NUCLEATED RED BLOOD CELLS #: 0 10^3/UL (ref 0–0)
NUCLEATED RED BLOOD CELLS%: 0 /100WBC (ref 0–0)
PHOSPHORUS: 3.3 MG/DL (ref 2.5–4.9)
PLATELET COUNT: 265 10^3/UL (ref 140–415)
POTASSIUM: 3.6 MMOL/L (ref 3.5–5.1)
RED BLOOD COUNT: 3.63 10^6/UL (ref 4.2–5.4)
RED CELL DISTRIBUTION WIDTH: 14.8 % (ref 11.5–14.5)
SODIUM: 144 MMOL/L (ref 135–144)
WHITE BLOOD COUNT: 14.9 10^3/UL (ref 4.8–10.8)

## 2018-07-17 PROCEDURE — 0QB10ZZ EXCISION OF SACRUM, OPEN APPROACH: ICD-10-PCS

## 2018-07-17 PROCEDURE — 02HV33Z INSERTION OF INFUSION DEVICE INTO SUPERIOR VENA CAVA, PERCUTANEOUS APPROACH: ICD-10-PCS

## 2018-07-17 RX ADMIN — LORATADINE 1 MG: 10 TABLET ORAL at 09:00

## 2018-07-17 RX ADMIN — PANTOPRAZOLE SODIUM 1 MG: 40 TABLET, DELAYED RELEASE ORAL at 09:00

## 2018-07-17 RX ADMIN — CASTOR OIL AND BALSAM, PERU 1 APPLIC: 788; 87 OINTMENT TOPICAL at 10:51

## 2018-07-17 RX ADMIN — CASTOR OIL AND BALSAM, PERU 1 APPLIC: 788; 87 OINTMENT TOPICAL at 21:40

## 2018-07-17 RX ADMIN — NYSTATIN 1 APPLIC: 100000 CREAM TOPICAL at 09:21

## 2018-07-17 RX ADMIN — MULTIPLE VITAMINS W/ MINERALS TAB 1 TAB: TAB at 13:48

## 2018-07-17 RX ADMIN — INSULIN ASPART 1 UNIT: 100 INJECTION, SOLUTION INTRAVENOUS; SUBCUTANEOUS at 08:15

## 2018-07-17 RX ADMIN — POTASSIUM CHLORIDE 1 MEQ: 10 TABLET, EXTENDED RELEASE ORAL at 09:00

## 2018-07-17 RX ADMIN — DOCUSATE SODIUM 1 MG: 100 CAPSULE, LIQUID FILLED ORAL at 12:52

## 2018-07-17 RX ADMIN — SERTRALINE HYDROCHLORIDE 1 MG: 50 TABLET ORAL at 21:39

## 2018-07-17 RX ADMIN — MAGNESIUM HYDROXIDE 1 ML: 400 SUSPENSION ORAL at 12:52

## 2018-07-17 RX ADMIN — CLOPIDOGREL 1 MG: 75 TABLET, FILM COATED ORAL at 13:50

## 2018-07-17 RX ADMIN — METOPROLOL TARTRATE 1 MG: 25 TABLET ORAL at 13:49

## 2018-07-17 RX ADMIN — FERROUS SULFATE TAB 325 MG (65 MG ELEMENTAL FE) 1 MG: 325 (65 FE) TAB at 09:00

## 2018-07-17 RX ADMIN — INSULIN ASPART 1 UNIT: 100 INJECTION, SOLUTION INTRAVENOUS; SUBCUTANEOUS at 12:15

## 2018-07-17 RX ADMIN — PIPERACILLIN AND TAZOBACTAM 1 MLS/HR: 2; .25 INJECTION, POWDER, FOR SOLUTION INTRAVENOUS at 06:04

## 2018-07-17 RX ADMIN — NYSTATIN 1 APPLIC: 100000 CREAM TOPICAL at 21:40

## 2018-07-17 RX ADMIN — INSULIN ASPART 1 UNIT: 100 INJECTION, SOLUTION INTRAVENOUS; SUBCUTANEOUS at 21:43

## 2018-07-17 RX ADMIN — MIDODRINE HYDROCHLORIDE 1 MG: 5 TABLET ORAL at 13:00

## 2018-07-17 RX ADMIN — ASPIRIN 1 MG: 81 TABLET, COATED ORAL at 09:00

## 2018-07-17 RX ADMIN — METOPROLOL TARTRATE 1 MG: 25 TABLET ORAL at 09:00

## 2018-07-17 RX ADMIN — VANCOMYCIN HYDROCHLORIDE 1 MLS/HR: 1 INJECTION, POWDER, LYOPHILIZED, FOR SOLUTION INTRAVENOUS at 13:00

## 2018-07-17 RX ADMIN — ASCORBIC ACID, VITAMIN A PALMITATE, CHOLECALCIFEROL, THIAMINE HYDROCHLORIDE, RIBOFLAVIN-5 PHOSPHATE SODIUM, PYRIDOXINE HYDROCHLORIDE, NIACINAMIDE, DEXPANTHENOL, ALPHA-TOCOPHEROL ACETATE, VITAMIN K1, FOLIC ACID, BIOTIN, CYANOCOBALAMIN 1 MLS/HR: 200; 3300; 200; 6; 3.6; 6; 40; 15; 10; 150; 600; 60; 5 INJECTION, SOLUTION INTRAVENOUS at 06:05

## 2018-07-17 RX ADMIN — METOPROLOL TARTRATE 1 MG: 25 TABLET ORAL at 21:00

## 2018-07-17 RX ADMIN — ACETAMINOPHEN 1 MG: 325 TABLET, FILM COATED ORAL at 03:01

## 2018-07-17 RX ADMIN — MIDODRINE HYDROCHLORIDE 1 MG: 5 TABLET ORAL at 21:00

## 2018-07-17 RX ADMIN — MULTIPLE VITAMINS W/ MINERALS TAB 1 TAB: TAB at 09:00

## 2018-07-17 RX ADMIN — PREGABALIN 1 MG: 50 CAPSULE ORAL at 09:00

## 2018-07-17 RX ADMIN — HYDROCODONE BITARTRATE AND ACETAMINOPHEN 1 TAB: 5; 325 TABLET ORAL at 14:43

## 2018-07-17 RX ADMIN — VITAMIN C 1 CAP: TAB at 09:00

## 2018-07-17 RX ADMIN — ASCORBIC ACID, VITAMIN A PALMITATE, CHOLECALCIFEROL, THIAMINE HYDROCHLORIDE, RIBOFLAVIN-5 PHOSPHATE SODIUM, PYRIDOXINE HYDROCHLORIDE, NIACINAMIDE, DEXPANTHENOL, ALPHA-TOCOPHEROL ACETATE, VITAMIN K1, FOLIC ACID, BIOTIN, CYANOCOBALAMIN 1 MLS/HR: 200; 3300; 200; 6; 3.6; 6; 40; 15; 10; 150; 600; 60; 5 INJECTION, SOLUTION INTRAVENOUS at 10:49

## 2018-07-17 RX ADMIN — SODIUM CHLORIDE 1 APPLIC: 0.9 IRRIGANT IRRIGATION at 11:00

## 2018-07-17 RX ADMIN — CLOPIDOGREL 1 MG: 75 TABLET, FILM COATED ORAL at 09:00

## 2018-07-17 RX ADMIN — INSULIN ASPART 1 UNIT: 100 INJECTION, SOLUTION INTRAVENOUS; SUBCUTANEOUS at 17:07

## 2018-07-17 RX ADMIN — COLLAGENASE SANTYL 1 APPLIC: 250 OINTMENT TOPICAL at 13:39

## 2018-07-17 RX ADMIN — PIPERACILLIN AND TAZOBACTAM 1 MLS/HR: 2; .25 INJECTION, POWDER, FOR SOLUTION INTRAVENOUS at 21:57

## 2018-07-17 RX ADMIN — SODIUM CHLORIDE 1 APPLIC: 0.9 IRRIGANT IRRIGATION at 21:00

## 2018-07-17 RX ADMIN — PREGABALIN 1 MG: 50 CAPSULE ORAL at 21:55

## 2018-07-17 RX ADMIN — MIDODRINE HYDROCHLORIDE 1 MG: 5 TABLET ORAL at 09:00

## 2018-07-17 RX ADMIN — PANTOPRAZOLE SODIUM 1 MG: 40 TABLET, DELAYED RELEASE ORAL at 13:48

## 2018-07-17 RX ADMIN — QUETIAPINE FUMARATE 1 MG: 25 TABLET ORAL at 21:39

## 2018-07-17 RX ADMIN — POTASSIUM CHLORIDE 1 MEQ: 10 TABLET, EXTENDED RELEASE ORAL at 13:50

## 2018-07-17 RX ADMIN — ASPIRIN 1 MG: 81 TABLET, COATED ORAL at 13:55

## 2018-07-17 RX ADMIN — PIPERACILLIN AND TAZOBACTAM 1 MLS/HR: 2; .25 INJECTION, POWDER, FOR SOLUTION INTRAVENOUS at 13:56

## 2018-07-18 LAB
ADD MAN DIFF?: NO
ANION GAP: 9 (ref 8–16)
BASOPHIL #: 0 10^3/UL (ref 0–0.1)
BASOPHILS %: 0.2 % (ref 0–2)
BLOOD UREA NITROGEN: 20 MG/DL (ref 7–20)
CALCIUM: 8.1 MG/DL (ref 8.4–10.2)
CARBON DIOXIDE: 19 MMOL/L (ref 21–31)
CHLORIDE: 117 MMOL/L (ref 97–110)
CREATININE: 0.86 MG/DL (ref 0.44–1)
EOSINOPHILS #: 0.4 10^3/UL (ref 0–0.5)
EOSINOPHILS %: 3.2 % (ref 0–7)
GLUCOSE: 138 MG/DL (ref 70–220)
HEMATOCRIT: 28.7 % (ref 37–47)
HEMOGLOBIN: 9.1 G/DL (ref 12–16)
LYMPHOCYTES #: 1.3 10^3/UL (ref 0.8–2.9)
LYMPHOCYTES %: 10.5 % (ref 15–51)
MAGNESIUM: 2 MG/DL (ref 1.7–2.5)
MEAN CORPUSCULAR HEMOGLOBIN: 26.7 PG (ref 29–33)
MEAN CORPUSCULAR HGB CONC: 31.7 G/DL (ref 32–37)
MEAN CORPUSCULAR VOLUME: 84.2 FL (ref 82–101)
MEAN PLATELET VOLUME: 9.3 FL (ref 7.4–10.4)
MONOCYTE #: 0.8 10^3/UL (ref 0.3–0.9)
MONOCYTES %: 6 % (ref 0–11)
NEUTROPHIL #: 9.7 10^3/UL (ref 1.6–7.5)
NEUTROPHILS %: 78.2 % (ref 39–77)
NUCLEATED RED BLOOD CELLS #: 0 10^3/UL (ref 0–0)
NUCLEATED RED BLOOD CELLS%: 0 /100WBC (ref 0–0)
PHOSPHORUS: 2.9 MG/DL (ref 2.5–4.9)
PLATELET COUNT: 231 10^3/UL (ref 140–415)
POTASSIUM: 3.7 MMOL/L (ref 3.5–5.1)
RED BLOOD COUNT: 3.41 10^6/UL (ref 4.2–5.4)
RED CELL DISTRIBUTION WIDTH: 14.8 % (ref 11.5–14.5)
SODIUM: 141 MMOL/L (ref 135–144)
WHITE BLOOD COUNT: 12.4 10^3/UL (ref 4.8–10.8)

## 2018-07-18 RX ADMIN — INSULIN ASPART 1 UNIT: 100 INJECTION, SOLUTION INTRAVENOUS; SUBCUTANEOUS at 08:22

## 2018-07-18 RX ADMIN — MIDODRINE HYDROCHLORIDE 1 MG: 5 TABLET ORAL at 20:44

## 2018-07-18 RX ADMIN — QUETIAPINE FUMARATE 1 MG: 25 TABLET ORAL at 20:29

## 2018-07-18 RX ADMIN — LORATADINE 1 MG: 10 TABLET ORAL at 08:18

## 2018-07-18 RX ADMIN — MIDODRINE HYDROCHLORIDE 1 MG: 5 TABLET ORAL at 12:12

## 2018-07-18 RX ADMIN — HYDROCODONE BITARTRATE AND ACETAMINOPHEN 1 TAB: 5; 325 TABLET ORAL at 01:14

## 2018-07-18 RX ADMIN — VANCOMYCIN HYDROCHLORIDE 1 MLS/HR: 1 INJECTION, POWDER, LYOPHILIZED, FOR SOLUTION INTRAVENOUS at 12:55

## 2018-07-18 RX ADMIN — PIPERACILLIN AND TAZOBACTAM 1 MLS/HR: 2; .25 INJECTION, POWDER, FOR SOLUTION INTRAVENOUS at 16:20

## 2018-07-18 RX ADMIN — CASTOR OIL AND BALSAM, PERU 1 APPLIC: 788; 87 OINTMENT TOPICAL at 20:32

## 2018-07-18 RX ADMIN — MULTIPLE VITAMINS W/ MINERALS TAB 1 TAB: TAB at 08:18

## 2018-07-18 RX ADMIN — ASCORBIC ACID, VITAMIN A PALMITATE, CHOLECALCIFEROL, THIAMINE HYDROCHLORIDE, RIBOFLAVIN-5 PHOSPHATE SODIUM, PYRIDOXINE HYDROCHLORIDE, NIACINAMIDE, DEXPANTHENOL, ALPHA-TOCOPHEROL ACETATE, VITAMIN K1, FOLIC ACID, BIOTIN, CYANOCOBALAMIN 1 MLS/HR: 200; 3300; 200; 6; 3.6; 6; 40; 15; 10; 150; 600; 60; 5 INJECTION, SOLUTION INTRAVENOUS at 18:28

## 2018-07-18 RX ADMIN — NYSTATIN 1 APPLIC: 100000 CREAM TOPICAL at 20:32

## 2018-07-18 RX ADMIN — INSULIN ASPART 1 UNIT: 100 INJECTION, SOLUTION INTRAVENOUS; SUBCUTANEOUS at 20:44

## 2018-07-18 RX ADMIN — PIPERACILLIN AND TAZOBACTAM 1 MLS/HR: 2; .25 INJECTION, POWDER, FOR SOLUTION INTRAVENOUS at 05:36

## 2018-07-18 RX ADMIN — INSULIN ASPART 1 UNIT: 100 INJECTION, SOLUTION INTRAVENOUS; SUBCUTANEOUS at 12:20

## 2018-07-18 RX ADMIN — METOPROLOL TARTRATE 1 MG: 25 TABLET ORAL at 08:19

## 2018-07-18 RX ADMIN — PANTOPRAZOLE SODIUM 1 MG: 40 TABLET, DELAYED RELEASE ORAL at 08:18

## 2018-07-18 RX ADMIN — FERROUS SULFATE TAB 325 MG (65 MG ELEMENTAL FE) 1 MG: 325 (65 FE) TAB at 08:18

## 2018-07-18 RX ADMIN — CASTOR OIL AND BALSAM, PERU 1 APPLIC: 788; 87 OINTMENT TOPICAL at 08:19

## 2018-07-18 RX ADMIN — PREGABALIN 1 MG: 50 CAPSULE ORAL at 20:29

## 2018-07-18 RX ADMIN — MIDODRINE HYDROCHLORIDE 1 MG: 5 TABLET ORAL at 08:18

## 2018-07-18 RX ADMIN — DIGOXIN 1 MG: 125 TABLET ORAL at 12:13

## 2018-07-18 RX ADMIN — HYDROCODONE BITARTRATE AND ACETAMINOPHEN 1 TAB: 5; 325 TABLET ORAL at 20:30

## 2018-07-18 RX ADMIN — VITAMIN C 1 CAP: TAB at 08:18

## 2018-07-18 RX ADMIN — INSULIN ASPART 1 UNIT: 100 INJECTION, SOLUTION INTRAVENOUS; SUBCUTANEOUS at 17:49

## 2018-07-18 RX ADMIN — SERTRALINE HYDROCHLORIDE 1 MG: 50 TABLET ORAL at 20:28

## 2018-07-18 RX ADMIN — NYSTATIN 1 APPLIC: 100000 CREAM TOPICAL at 08:19

## 2018-07-18 RX ADMIN — PREGABALIN 1 MG: 50 CAPSULE ORAL at 08:19

## 2018-07-18 RX ADMIN — HYDROCODONE BITARTRATE AND ACETAMINOPHEN 1 TAB: 5; 325 TABLET ORAL at 06:41

## 2018-07-18 RX ADMIN — METOPROLOL TARTRATE 1 MG: 25 TABLET ORAL at 20:29

## 2018-07-18 RX ADMIN — POTASSIUM CHLORIDE 1 MEQ: 10 TABLET, EXTENDED RELEASE ORAL at 08:19

## 2018-07-18 RX ADMIN — PIPERACILLIN AND TAZOBACTAM 1 MLS/HR: 2; .25 INJECTION, POWDER, FOR SOLUTION INTRAVENOUS at 21:42

## 2018-07-19 LAB
ADD MAN DIFF?: NO
ANION GAP: 8 (ref 8–16)
BASOPHIL #: 0 10^3/UL (ref 0–0.1)
BASOPHILS %: 0.2 % (ref 0–2)
BLOOD UREA NITROGEN: 14 MG/DL (ref 7–20)
CALCIUM: 7.9 MG/DL (ref 8.4–10.2)
CARBON DIOXIDE: 19 MMOL/L (ref 21–31)
CHLORIDE: 115 MMOL/L (ref 97–110)
CREATININE: 0.75 MG/DL (ref 0.44–1)
EOSINOPHILS #: 0.4 10^3/UL (ref 0–0.5)
EOSINOPHILS %: 3.8 % (ref 0–7)
GLUCOSE: 139 MG/DL (ref 70–220)
HEMATOCRIT: 28.3 % (ref 37–47)
HEMOGLOBIN: 8.8 G/DL (ref 12–16)
LYMPHOCYTES #: 1.1 10^3/UL (ref 0.8–2.9)
LYMPHOCYTES %: 10.8 % (ref 15–51)
MAGNESIUM: 1.9 MG/DL (ref 1.7–2.5)
MEAN CORPUSCULAR HEMOGLOBIN: 26 PG (ref 29–33)
MEAN CORPUSCULAR HGB CONC: 31.1 G/DL (ref 32–37)
MEAN CORPUSCULAR VOLUME: 83.5 FL (ref 82–101)
MEAN PLATELET VOLUME: 9.3 FL (ref 7.4–10.4)
MONOCYTE #: 0.6 10^3/UL (ref 0.3–0.9)
MONOCYTES %: 5.4 % (ref 0–11)
NEUTROPHIL #: 8 10^3/UL (ref 1.6–7.5)
NEUTROPHILS %: 77.6 % (ref 39–77)
NUCLEATED RED BLOOD CELLS #: 0 10^3/UL (ref 0–0)
NUCLEATED RED BLOOD CELLS%: 0 /100WBC (ref 0–0)
PHOSPHORUS: 2.6 MG/DL (ref 2.5–4.9)
PLATELET COUNT: 242 10^3/UL (ref 140–415)
POTASSIUM: 3.4 MMOL/L (ref 3.5–5.1)
RED BLOOD COUNT: 3.39 10^6/UL (ref 4.2–5.4)
RED CELL DISTRIBUTION WIDTH: 15 % (ref 11.5–14.5)
SODIUM: 139 MMOL/L (ref 135–144)
WHITE BLOOD COUNT: 10.3 10^3/UL (ref 4.8–10.8)

## 2018-07-19 RX ADMIN — METOPROLOL TARTRATE 1 MG: 25 TABLET ORAL at 21:56

## 2018-07-19 RX ADMIN — PIPERACILLIN AND TAZOBACTAM 1 MLS/HR: 2; .25 INJECTION, POWDER, FOR SOLUTION INTRAVENOUS at 22:20

## 2018-07-19 RX ADMIN — MIDODRINE HYDROCHLORIDE 1 MG: 5 TABLET ORAL at 21:00

## 2018-07-19 RX ADMIN — PREGABALIN 1 MG: 50 CAPSULE ORAL at 21:56

## 2018-07-19 RX ADMIN — PANTOPRAZOLE SODIUM 1 MG: 40 TABLET, DELAYED RELEASE ORAL at 09:29

## 2018-07-19 RX ADMIN — MIDODRINE HYDROCHLORIDE 1 MG: 5 TABLET ORAL at 09:28

## 2018-07-19 RX ADMIN — INSULIN ASPART 1 UNIT: 100 INJECTION, SOLUTION INTRAVENOUS; SUBCUTANEOUS at 12:11

## 2018-07-19 RX ADMIN — POTASSIUM CHLORIDE 1 MLS/HR: 200 INJECTION, SOLUTION INTRAVENOUS at 11:29

## 2018-07-19 RX ADMIN — LORATADINE 1 MG: 10 TABLET ORAL at 09:28

## 2018-07-19 RX ADMIN — NYSTATIN 1 APPLIC: 100000 CREAM TOPICAL at 09:28

## 2018-07-19 RX ADMIN — QUETIAPINE FUMARATE 1 MG: 25 TABLET ORAL at 21:56

## 2018-07-19 RX ADMIN — PREGABALIN 1 MG: 50 CAPSULE ORAL at 09:40

## 2018-07-19 RX ADMIN — POTASSIUM CHLORIDE 1 MLS/HR: 200 INJECTION, SOLUTION INTRAVENOUS at 13:11

## 2018-07-19 RX ADMIN — ASCORBIC ACID, VITAMIN A PALMITATE, CHOLECALCIFEROL, THIAMINE HYDROCHLORIDE, RIBOFLAVIN-5 PHOSPHATE SODIUM, PYRIDOXINE HYDROCHLORIDE, NIACINAMIDE, DEXPANTHENOL, ALPHA-TOCOPHEROL ACETATE, VITAMIN K1, FOLIC ACID, BIOTIN, CYANOCOBALAMIN 1 MLS/HR: 200; 3300; 200; 6; 3.6; 6; 40; 15; 10; 150; 600; 60; 5 INJECTION, SOLUTION INTRAVENOUS at 17:46

## 2018-07-19 RX ADMIN — PIPERACILLIN AND TAZOBACTAM 1 MLS/HR: 2; .25 INJECTION, POWDER, FOR SOLUTION INTRAVENOUS at 06:09

## 2018-07-19 RX ADMIN — METOPROLOL TARTRATE 1 MG: 25 TABLET ORAL at 09:29

## 2018-07-19 RX ADMIN — VANCOMYCIN HYDROCHLORIDE 1 MLS/HR: 1 INJECTION, POWDER, LYOPHILIZED, FOR SOLUTION INTRAVENOUS at 17:08

## 2018-07-19 RX ADMIN — POTASSIUM CHLORIDE 1 MEQ: 10 TABLET, EXTENDED RELEASE ORAL at 09:28

## 2018-07-19 RX ADMIN — INSULIN ASPART 1 UNIT: 100 INJECTION, SOLUTION INTRAVENOUS; SUBCUTANEOUS at 17:46

## 2018-07-19 RX ADMIN — CASTOR OIL AND BALSAM, PERU 1 APPLIC: 788; 87 OINTMENT TOPICAL at 09:29

## 2018-07-19 RX ADMIN — NYSTATIN 1 APPLIC: 100000 CREAM TOPICAL at 21:57

## 2018-07-19 RX ADMIN — INSULIN ASPART 1 UNIT: 100 INJECTION, SOLUTION INTRAVENOUS; SUBCUTANEOUS at 08:41

## 2018-07-19 RX ADMIN — CASTOR OIL AND BALSAM, PERU 1 APPLIC: 788; 87 OINTMENT TOPICAL at 21:58

## 2018-07-19 RX ADMIN — SERTRALINE HYDROCHLORIDE 1 MG: 50 TABLET ORAL at 21:55

## 2018-07-19 RX ADMIN — PIPERACILLIN AND TAZOBACTAM 1 MLS/HR: 2; .25 INJECTION, POWDER, FOR SOLUTION INTRAVENOUS at 14:35

## 2018-07-19 RX ADMIN — FERROUS SULFATE TAB 325 MG (65 MG ELEMENTAL FE) 1 MG: 325 (65 FE) TAB at 09:28

## 2018-07-19 RX ADMIN — VITAMIN C 1 CAP: TAB at 09:28

## 2018-07-19 RX ADMIN — MULTIPLE VITAMINS W/ MINERALS TAB 1 TAB: TAB at 09:28

## 2018-07-19 RX ADMIN — INSULIN ASPART 1 UNIT: 100 INJECTION, SOLUTION INTRAVENOUS; SUBCUTANEOUS at 21:00

## 2018-07-19 RX ADMIN — ASCORBIC ACID, VITAMIN A PALMITATE, CHOLECALCIFEROL, THIAMINE HYDROCHLORIDE, RIBOFLAVIN-5 PHOSPHATE SODIUM, PYRIDOXINE HYDROCHLORIDE, NIACINAMIDE, DEXPANTHENOL, ALPHA-TOCOPHEROL ACETATE, VITAMIN K1, FOLIC ACID, BIOTIN, CYANOCOBALAMIN 1 MLS/HR: 200; 3300; 200; 6; 3.6; 6; 40; 15; 10; 150; 600; 60; 5 INJECTION, SOLUTION INTRAVENOUS at 22:20

## 2018-07-20 LAB
ADD MAN DIFF?: NO
ANION GAP: 10 (ref 8–16)
BASOPHIL #: 0 10^3/UL (ref 0–0.1)
BASOPHILS %: 0.2 % (ref 0–2)
BLOOD UREA NITROGEN: 10 MG/DL (ref 7–20)
CALCIUM: 7.8 MG/DL (ref 8.4–10.2)
CARBON DIOXIDE: 21 MMOL/L (ref 21–31)
CHLORIDE: 112 MMOL/L (ref 97–110)
CREATININE: 0.67 MG/DL (ref 0.44–1)
EOSINOPHILS #: 0.4 10^3/UL (ref 0–0.5)
EOSINOPHILS %: 3.2 % (ref 0–7)
GLUCOSE: 130 MG/DL (ref 70–220)
HEMATOCRIT: 29.6 % (ref 37–47)
HEMOGLOBIN: 9.1 G/DL (ref 12–16)
LYMPHOCYTES #: 1.5 10^3/UL (ref 0.8–2.9)
LYMPHOCYTES %: 12.8 % (ref 15–51)
MAGNESIUM: 2 MG/DL (ref 1.7–2.5)
MEAN CORPUSCULAR HEMOGLOBIN: 25.5 PG (ref 29–33)
MEAN CORPUSCULAR HGB CONC: 30.7 G/DL (ref 32–37)
MEAN CORPUSCULAR VOLUME: 82.9 FL (ref 82–101)
MEAN PLATELET VOLUME: 8.8 FL (ref 7.4–10.4)
MONOCYTE #: 0.7 10^3/UL (ref 0.3–0.9)
MONOCYTES %: 5.7 % (ref 0–11)
NEUTROPHIL #: 8.7 10^3/UL (ref 1.6–7.5)
NEUTROPHILS %: 76.3 % (ref 39–77)
NUCLEATED RED BLOOD CELLS #: 0 10^3/UL (ref 0–0)
NUCLEATED RED BLOOD CELLS%: 0 /100WBC (ref 0–0)
PHOSPHORUS: 2.7 MG/DL (ref 2.5–4.9)
PLATELET COUNT: 236 10^3/UL (ref 140–415)
POTASSIUM: 3.6 MMOL/L (ref 3.5–5.1)
RED BLOOD COUNT: 3.57 10^6/UL (ref 4.2–5.4)
RED CELL DISTRIBUTION WIDTH: 15 % (ref 11.5–14.5)
SODIUM: 139 MMOL/L (ref 135–144)
WHITE BLOOD COUNT: 11.4 10^3/UL (ref 4.8–10.8)

## 2018-07-20 RX ADMIN — NYSTATIN 1 APPLIC: 100000 CREAM TOPICAL at 22:52

## 2018-07-20 RX ADMIN — INSULIN ASPART 1 UNIT: 100 INJECTION, SOLUTION INTRAVENOUS; SUBCUTANEOUS at 12:13

## 2018-07-20 RX ADMIN — PREGABALIN 1 MG: 50 CAPSULE ORAL at 21:11

## 2018-07-20 RX ADMIN — VASOPRESSIN 1 ML/HR: 20 INJECTION, SOLUTION INTRAMUSCULAR; SUBCUTANEOUS at 16:25

## 2018-07-20 RX ADMIN — MIDODRINE HYDROCHLORIDE 1 MG: 5 TABLET ORAL at 09:00

## 2018-07-20 RX ADMIN — METOPROLOL TARTRATE 1 MG: 25 TABLET ORAL at 09:00

## 2018-07-20 RX ADMIN — PIPERACILLIN AND TAZOBACTAM 1 MLS/HR: 2; .25 INJECTION, POWDER, FOR SOLUTION INTRAVENOUS at 06:04

## 2018-07-20 RX ADMIN — POTASSIUM CHLORIDE 1 MEQ: 10 TABLET, EXTENDED RELEASE ORAL at 09:00

## 2018-07-20 RX ADMIN — HYDROCODONE BITARTRATE AND ACETAMINOPHEN 1 TAB: 5; 325 TABLET ORAL at 09:29

## 2018-07-20 RX ADMIN — NYSTATIN 1 APPLIC: 100000 CREAM TOPICAL at 17:26

## 2018-07-20 RX ADMIN — SERTRALINE HYDROCHLORIDE 1 MG: 50 TABLET ORAL at 21:11

## 2018-07-20 RX ADMIN — DIGOXIN 1 MG: 125 TABLET ORAL at 13:56

## 2018-07-20 RX ADMIN — INSULIN ASPART 1 UNIT: 100 INJECTION, SOLUTION INTRAVENOUS; SUBCUTANEOUS at 17:28

## 2018-07-20 RX ADMIN — PANTOPRAZOLE SODIUM 1 MG: 40 TABLET, DELAYED RELEASE ORAL at 09:00

## 2018-07-20 RX ADMIN — VITAMIN C 1 CAP: TAB at 09:00

## 2018-07-20 RX ADMIN — LORATADINE 1 MG: 10 TABLET ORAL at 09:00

## 2018-07-20 RX ADMIN — ONDANSETRON HYDROCHLORIDE 1 MG: 2 INJECTION, SOLUTION INTRAMUSCULAR; INTRAVENOUS at 12:48

## 2018-07-20 RX ADMIN — CASTOR OIL AND BALSAM, PERU 1 APPLIC: 788; 87 OINTMENT TOPICAL at 17:26

## 2018-07-20 RX ADMIN — MIDODRINE HYDROCHLORIDE 1 MG: 5 TABLET ORAL at 21:11

## 2018-07-20 RX ADMIN — INSULIN ASPART 1 UNIT: 100 INJECTION, SOLUTION INTRAVENOUS; SUBCUTANEOUS at 21:00

## 2018-07-20 RX ADMIN — FERROUS SULFATE TAB 325 MG (65 MG ELEMENTAL FE) 1 MG: 325 (65 FE) TAB at 09:00

## 2018-07-20 RX ADMIN — METOPROLOL TARTRATE 1 MG: 25 TABLET ORAL at 21:11

## 2018-07-20 RX ADMIN — MULTIPLE VITAMINS W/ MINERALS TAB 1 TAB: TAB at 09:00

## 2018-07-20 RX ADMIN — LIDOCAINE HYDROCHLORIDE 1 ML: 10 INJECTION, SOLUTION EPIDURAL; INFILTRATION; INTRACAUDAL; PERINEURAL at 16:00

## 2018-07-20 RX ADMIN — QUETIAPINE FUMARATE 1 MG: 25 TABLET ORAL at 21:11

## 2018-07-20 RX ADMIN — ASCORBIC ACID, VITAMIN A PALMITATE, CHOLECALCIFEROL, THIAMINE HYDROCHLORIDE, RIBOFLAVIN-5 PHOSPHATE SODIUM, PYRIDOXINE HYDROCHLORIDE, NIACINAMIDE, DEXPANTHENOL, ALPHA-TOCOPHEROL ACETATE, VITAMIN K1, FOLIC ACID, BIOTIN, CYANOCOBALAMIN 1 MLS/HR: 200; 3300; 200; 6; 3.6; 6; 40; 15; 10; 150; 600; 60; 5 INJECTION, SOLUTION INTRAVENOUS at 22:52

## 2018-07-20 RX ADMIN — HYDROCODONE BITARTRATE AND ACETAMINOPHEN 1 TAB: 5; 325 TABLET ORAL at 15:25

## 2018-07-20 RX ADMIN — VANCOMYCIN HYDROCHLORIDE 1 MLS/HR: 1 INJECTION, POWDER, LYOPHILIZED, FOR SOLUTION INTRAVENOUS at 18:29

## 2018-07-20 RX ADMIN — PREGABALIN 1 MG: 50 CAPSULE ORAL at 09:00

## 2018-07-20 RX ADMIN — INSULIN ASPART 1 UNIT: 100 INJECTION, SOLUTION INTRAVENOUS; SUBCUTANEOUS at 07:58

## 2018-07-21 LAB
ADD MAN DIFF?: NO
ANION GAP: 9 (ref 8–16)
BASOPHIL #: 0 10^3/UL (ref 0–0.1)
BASOPHILS %: 0.2 % (ref 0–2)
BLOOD UREA NITROGEN: 8 MG/DL (ref 7–20)
CALCIUM: 7.8 MG/DL (ref 8.4–10.2)
CARBON DIOXIDE: 21 MMOL/L (ref 21–31)
CHLORIDE: 112 MMOL/L (ref 97–110)
CREATININE: 0.67 MG/DL (ref 0.44–1)
EOSINOPHILS #: 0.4 10^3/UL (ref 0–0.5)
EOSINOPHILS %: 2.9 % (ref 0–7)
GLUCOSE: 146 MG/DL (ref 70–220)
HEMATOCRIT: 28.3 % (ref 37–47)
HEMOGLOBIN: 8.8 G/DL (ref 12–16)
LYMPHOCYTES #: 1.3 10^3/UL (ref 0.8–2.9)
LYMPHOCYTES %: 10.8 % (ref 15–51)
MAGNESIUM: 2 MG/DL (ref 1.7–2.5)
MEAN CORPUSCULAR HEMOGLOBIN: 25.8 PG (ref 29–33)
MEAN CORPUSCULAR HGB CONC: 31.1 G/DL (ref 32–37)
MEAN CORPUSCULAR VOLUME: 83 FL (ref 82–101)
MEAN PLATELET VOLUME: 8.9 FL (ref 7.4–10.4)
MONOCYTE #: 0.8 10^3/UL (ref 0.3–0.9)
MONOCYTES %: 6.1 % (ref 0–11)
NEUTROPHIL #: 9.6 10^3/UL (ref 1.6–7.5)
NEUTROPHILS %: 78.2 % (ref 39–77)
NUCLEATED RED BLOOD CELLS #: 0 10^3/UL (ref 0–0)
NUCLEATED RED BLOOD CELLS%: 0 /100WBC (ref 0–0)
PHOSPHORUS: 3 MG/DL (ref 2.5–4.9)
PLATELET COUNT: 246 10^3/UL (ref 140–415)
POTASSIUM: 3.5 MMOL/L (ref 3.5–5.1)
RED BLOOD COUNT: 3.41 10^6/UL (ref 4.2–5.4)
RED CELL DISTRIBUTION WIDTH: 15 % (ref 11.5–14.5)
SODIUM: 138 MMOL/L (ref 135–144)
WHITE BLOOD COUNT: 12.2 10^3/UL (ref 4.8–10.8)

## 2018-07-21 RX ADMIN — LORATADINE 1 MG: 10 TABLET ORAL at 08:34

## 2018-07-21 RX ADMIN — CASTOR OIL AND BALSAM, PERU 1 APPLIC: 788; 87 OINTMENT TOPICAL at 04:09

## 2018-07-21 RX ADMIN — VANCOMYCIN HYDROCHLORIDE 1 MLS/HR: 1 INJECTION, POWDER, LYOPHILIZED, FOR SOLUTION INTRAVENOUS at 17:39

## 2018-07-21 RX ADMIN — PREGABALIN 1 MG: 50 CAPSULE ORAL at 20:35

## 2018-07-21 RX ADMIN — INSULIN ASPART 1 UNIT: 100 INJECTION, SOLUTION INTRAVENOUS; SUBCUTANEOUS at 17:41

## 2018-07-21 RX ADMIN — INSULIN ASPART 1 UNIT: 100 INJECTION, SOLUTION INTRAVENOUS; SUBCUTANEOUS at 08:33

## 2018-07-21 RX ADMIN — HYDROCODONE BITARTRATE AND ACETAMINOPHEN 1 TAB: 5; 325 TABLET ORAL at 19:11

## 2018-07-21 RX ADMIN — MIDODRINE HYDROCHLORIDE 1 MG: 5 TABLET ORAL at 08:46

## 2018-07-21 RX ADMIN — NYSTATIN 1 APPLIC: 100000 CREAM TOPICAL at 08:37

## 2018-07-21 RX ADMIN — INSULIN ASPART 1 UNIT: 100 INJECTION, SOLUTION INTRAVENOUS; SUBCUTANEOUS at 20:54

## 2018-07-21 RX ADMIN — FERROUS SULFATE TAB 325 MG (65 MG ELEMENTAL FE) 1 MG: 325 (65 FE) TAB at 08:35

## 2018-07-21 RX ADMIN — PANTOPRAZOLE SODIUM 1 MG: 40 TABLET, DELAYED RELEASE ORAL at 08:37

## 2018-07-21 RX ADMIN — INSULIN ASPART 1 UNIT: 100 INJECTION, SOLUTION INTRAVENOUS; SUBCUTANEOUS at 12:07

## 2018-07-21 RX ADMIN — QUETIAPINE FUMARATE 1 MG: 25 TABLET ORAL at 20:35

## 2018-07-21 RX ADMIN — PREGABALIN 1 MG: 50 CAPSULE ORAL at 08:37

## 2018-07-21 RX ADMIN — POTASSIUM CHLORIDE 1 MEQ: 10 TABLET, EXTENDED RELEASE ORAL at 08:35

## 2018-07-21 RX ADMIN — CASTOR OIL AND BALSAM, PERU 1 APPLIC: 788; 87 OINTMENT TOPICAL at 08:38

## 2018-07-21 RX ADMIN — VITAMIN C 1 CAP: TAB at 08:34

## 2018-07-21 RX ADMIN — HYDROCODONE BITARTRATE AND ACETAMINOPHEN 1 TAB: 5; 325 TABLET ORAL at 20:35

## 2018-07-21 RX ADMIN — METOPROLOL TARTRATE 1 MG: 25 TABLET ORAL at 20:35

## 2018-07-21 RX ADMIN — SERTRALINE HYDROCHLORIDE 1 MG: 50 TABLET ORAL at 20:35

## 2018-07-21 RX ADMIN — ASCORBIC ACID, VITAMIN A PALMITATE, CHOLECALCIFEROL, THIAMINE HYDROCHLORIDE, RIBOFLAVIN-5 PHOSPHATE SODIUM, PYRIDOXINE HYDROCHLORIDE, NIACINAMIDE, DEXPANTHENOL, ALPHA-TOCOPHEROL ACETATE, VITAMIN K1, FOLIC ACID, BIOTIN, CYANOCOBALAMIN 1 MLS/HR: 200; 3300; 200; 6; 3.6; 6; 40; 15; 10; 150; 600; 60; 5 INJECTION, SOLUTION INTRAVENOUS at 19:10

## 2018-07-21 RX ADMIN — NYSTATIN 1 APPLIC: 100000 CREAM TOPICAL at 22:24

## 2018-07-21 RX ADMIN — MULTIPLE VITAMINS W/ MINERALS TAB 1 TAB: TAB at 08:37

## 2018-07-21 RX ADMIN — METOPROLOL TARTRATE 1 MG: 25 TABLET ORAL at 08:46

## 2018-07-22 LAB
ADD MAN DIFF?: NO
ANION GAP: 8 (ref 8–16)
BASOPHIL #: 0 10^3/UL (ref 0–0.1)
BASOPHILS %: 0.2 % (ref 0–2)
BLOOD UREA NITROGEN: 9 MG/DL (ref 7–20)
CALCIUM: 7.9 MG/DL (ref 8.4–10.2)
CARBON DIOXIDE: 23 MMOL/L (ref 21–31)
CHLORIDE: 112 MMOL/L (ref 97–110)
CREATININE: 0.89 MG/DL (ref 0.44–1)
EOSINOPHILS #: 0.3 10^3/UL (ref 0–0.5)
EOSINOPHILS %: 2.5 % (ref 0–7)
GLUCOSE: 142 MG/DL (ref 70–220)
HEMATOCRIT: 27.2 % (ref 37–47)
HEMOGLOBIN: 8.5 G/DL (ref 12–16)
LYMPHOCYTES #: 1.3 10^3/UL (ref 0.8–2.9)
LYMPHOCYTES %: 10.6 % (ref 15–51)
MAGNESIUM: 2 MG/DL (ref 1.7–2.5)
MEAN CORPUSCULAR HEMOGLOBIN: 26 PG (ref 29–33)
MEAN CORPUSCULAR HGB CONC: 31.3 G/DL (ref 32–37)
MEAN CORPUSCULAR VOLUME: 83.2 FL (ref 82–101)
MEAN PLATELET VOLUME: 8.9 FL (ref 7.4–10.4)
MONOCYTE #: 0.7 10^3/UL (ref 0.3–0.9)
MONOCYTES %: 5.7 % (ref 0–11)
NEUTROPHIL #: 9.5 10^3/UL (ref 1.6–7.5)
NEUTROPHILS %: 79.8 % (ref 39–77)
NUCLEATED RED BLOOD CELLS #: 0 10^3/UL (ref 0–0)
NUCLEATED RED BLOOD CELLS%: 0 /100WBC (ref 0–0)
PHOSPHORUS: 3.2 MG/DL (ref 2.5–4.9)
PLATELET COUNT: 245 10^3/UL (ref 140–415)
POTASSIUM: 3.4 MMOL/L (ref 3.5–5.1)
RED BLOOD COUNT: 3.27 10^6/UL (ref 4.2–5.4)
RED CELL DISTRIBUTION WIDTH: 14.9 % (ref 11.5–14.5)
SODIUM: 140 MMOL/L (ref 135–144)
VANCOMYCIN,TROUGH: 22.7 UG/ML (ref 10–20)
WHITE BLOOD COUNT: 11.9 10^3/UL (ref 4.8–10.8)

## 2018-07-22 RX ADMIN — PANTOPRAZOLE SODIUM 1 MG: 40 TABLET, DELAYED RELEASE ORAL at 09:00

## 2018-07-22 RX ADMIN — METOPROLOL TARTRATE 1 MG: 25 TABLET ORAL at 20:52

## 2018-07-22 RX ADMIN — NYSTATIN 1 APPLIC: 100000 CREAM TOPICAL at 09:19

## 2018-07-22 RX ADMIN — PREGABALIN 1 MG: 50 CAPSULE ORAL at 09:00

## 2018-07-22 RX ADMIN — SERTRALINE HYDROCHLORIDE 1 MG: 50 TABLET ORAL at 20:51

## 2018-07-22 RX ADMIN — MULTIPLE VITAMINS W/ MINERALS TAB 1 TAB: TAB at 09:00

## 2018-07-22 RX ADMIN — FERROUS SULFATE TAB 325 MG (65 MG ELEMENTAL FE) 1 MG: 325 (65 FE) TAB at 09:00

## 2018-07-22 RX ADMIN — PREGABALIN 1 MG: 50 CAPSULE ORAL at 20:51

## 2018-07-22 RX ADMIN — POTASSIUM CHLORIDE 1 MLS/HR: 200 INJECTION, SOLUTION INTRAVENOUS at 16:38

## 2018-07-22 RX ADMIN — LORATADINE 1 MG: 10 TABLET ORAL at 09:17

## 2018-07-22 RX ADMIN — ASCORBIC ACID, VITAMIN A PALMITATE, CHOLECALCIFEROL, THIAMINE HYDROCHLORIDE, RIBOFLAVIN-5 PHOSPHATE SODIUM, PYRIDOXINE HYDROCHLORIDE, NIACINAMIDE, DEXPANTHENOL, ALPHA-TOCOPHEROL ACETATE, VITAMIN K1, FOLIC ACID, BIOTIN, CYANOCOBALAMIN 1 MLS/HR: 200; 3300; 200; 6; 3.6; 6; 40; 15; 10; 150; 600; 60; 5 INJECTION, SOLUTION INTRAVENOUS at 22:12

## 2018-07-22 RX ADMIN — INSULIN ASPART 1 UNIT: 100 INJECTION, SOLUTION INTRAVENOUS; SUBCUTANEOUS at 12:45

## 2018-07-22 RX ADMIN — POTASSIUM CHLORIDE 1 MEQ: 10 TABLET, EXTENDED RELEASE ORAL at 09:18

## 2018-07-22 RX ADMIN — CASTOR OIL AND BALSAM, PERU 1 APPLIC: 788; 87 OINTMENT TOPICAL at 01:34

## 2018-07-22 RX ADMIN — POTASSIUM CHLORIDE 1 MEQ: 1.5 POWDER, FOR SOLUTION ORAL at 09:36

## 2018-07-22 RX ADMIN — CASTOR OIL AND BALSAM, PERU 1 APPLIC: 788; 87 OINTMENT TOPICAL at 20:53

## 2018-07-22 RX ADMIN — POTASSIUM CHLORIDE 1 MEQ: 10 TABLET, EXTENDED RELEASE ORAL at 09:00

## 2018-07-22 RX ADMIN — QUETIAPINE FUMARATE 1 MG: 25 TABLET ORAL at 20:51

## 2018-07-22 RX ADMIN — PREGABALIN 1 MG: 50 CAPSULE ORAL at 09:22

## 2018-07-22 RX ADMIN — VANCOMYCIN HYDROCHLORIDE 1 MLS/HR: 500 INJECTION, POWDER, LYOPHILIZED, FOR SOLUTION INTRAVENOUS at 22:22

## 2018-07-22 RX ADMIN — CASTOR OIL AND BALSAM, PERU 1 APPLIC: 788; 87 OINTMENT TOPICAL at 09:20

## 2018-07-22 RX ADMIN — DIGOXIN 1 MG: 125 TABLET ORAL at 13:38

## 2018-07-22 RX ADMIN — VITAMIN C 1 CAP: TAB at 09:18

## 2018-07-22 RX ADMIN — VITAMIN C 1 CAP: TAB at 09:00

## 2018-07-22 RX ADMIN — METOPROLOL TARTRATE 1 MG: 25 TABLET ORAL at 09:00

## 2018-07-22 RX ADMIN — LORATADINE 1 MG: 10 TABLET ORAL at 09:00

## 2018-07-22 RX ADMIN — INSULIN ASPART 1 UNIT: 100 INJECTION, SOLUTION INTRAVENOUS; SUBCUTANEOUS at 21:00

## 2018-07-22 RX ADMIN — METOPROLOL TARTRATE 1 MG: 25 TABLET ORAL at 09:18

## 2018-07-22 RX ADMIN — POTASSIUM CHLORIDE 1 MLS/HR: 200 INJECTION, SOLUTION INTRAVENOUS at 13:38

## 2018-07-22 RX ADMIN — INSULIN ASPART 1 UNIT: 100 INJECTION, SOLUTION INTRAVENOUS; SUBCUTANEOUS at 18:46

## 2018-07-22 RX ADMIN — INSULIN ASPART 1 UNIT: 100 INJECTION, SOLUTION INTRAVENOUS; SUBCUTANEOUS at 08:13

## 2018-07-22 RX ADMIN — HYDROCODONE BITARTRATE AND ACETAMINOPHEN 1 TAB: 5; 325 TABLET ORAL at 13:26

## 2018-07-22 RX ADMIN — PANTOPRAZOLE SODIUM 1 MG: 40 TABLET, DELAYED RELEASE ORAL at 09:17

## 2018-07-22 RX ADMIN — NYSTATIN 1 APPLIC: 100000 CREAM TOPICAL at 20:52

## 2018-07-22 RX ADMIN — HYDROCODONE BITARTRATE AND ACETAMINOPHEN 1 TAB: 5; 325 TABLET ORAL at 01:42

## 2018-07-22 RX ADMIN — MULTIPLE VITAMINS W/ MINERALS TAB 1 TAB: TAB at 09:17

## 2018-07-22 RX ADMIN — FERROUS SULFATE TAB 325 MG (65 MG ELEMENTAL FE) 1 MG: 325 (65 FE) TAB at 09:17

## 2018-07-23 LAB
ADD MAN DIFF?: NO
ANION GAP: 10 (ref 8–16)
BASOPHIL #: 0 10^3/UL (ref 0–0.1)
BASOPHILS %: 0.3 % (ref 0–2)
BLOOD UREA NITROGEN: 12 MG/DL (ref 7–20)
CALCIUM: 8 MG/DL (ref 8.4–10.2)
CARBON DIOXIDE: 21 MMOL/L (ref 21–31)
CHLORIDE: 112 MMOL/L (ref 97–110)
CREATININE: 1.19 MG/DL (ref 0.44–1)
EOSINOPHILS #: 0.4 10^3/UL (ref 0–0.5)
EOSINOPHILS %: 4.1 % (ref 0–7)
GLUCOSE: 139 MG/DL (ref 70–220)
HEMATOCRIT: 26.8 % (ref 37–47)
HEMOGLOBIN: 8.3 G/DL (ref 12–16)
LYMPHOCYTES #: 1.4 10^3/UL (ref 0.8–2.9)
LYMPHOCYTES %: 14.4 % (ref 15–51)
MAGNESIUM: 2.1 MG/DL (ref 1.7–2.5)
MEAN CORPUSCULAR HEMOGLOBIN: 26.2 PG (ref 29–33)
MEAN CORPUSCULAR HGB CONC: 31 G/DL (ref 32–37)
MEAN CORPUSCULAR VOLUME: 84.5 FL (ref 82–101)
MEAN PLATELET VOLUME: 8.8 FL (ref 7.4–10.4)
MONOCYTE #: 0.7 10^3/UL (ref 0.3–0.9)
MONOCYTES %: 6.6 % (ref 0–11)
NEUTROPHIL #: 7.3 10^3/UL (ref 1.6–7.5)
NEUTROPHILS %: 73.5 % (ref 39–77)
NUCLEATED RED BLOOD CELLS #: 0 10^3/UL (ref 0–0)
NUCLEATED RED BLOOD CELLS%: 0 /100WBC (ref 0–0)
PHOSPHORUS: 3.3 MG/DL (ref 2.5–4.9)
PLATELET COUNT: 229 10^3/UL (ref 140–415)
POTASSIUM: 4.1 MMOL/L (ref 3.5–5.1)
RED BLOOD COUNT: 3.17 10^6/UL (ref 4.2–5.4)
RED CELL DISTRIBUTION WIDTH: 15 % (ref 11.5–14.5)
SODIUM: 139 MMOL/L (ref 135–144)
WHITE BLOOD COUNT: 10 10^3/UL (ref 4.8–10.8)

## 2018-07-23 RX ADMIN — MULTIPLE VITAMINS W/ MINERALS TAB 1 TAB: TAB at 09:04

## 2018-07-23 RX ADMIN — PREGABALIN 1 MG: 50 CAPSULE ORAL at 20:56

## 2018-07-23 RX ADMIN — HYDROCODONE BITARTRATE AND ACETAMINOPHEN 1 TAB: 5; 325 TABLET ORAL at 20:58

## 2018-07-23 RX ADMIN — PANTOPRAZOLE SODIUM 1 MG: 40 TABLET, DELAYED RELEASE ORAL at 09:05

## 2018-07-23 RX ADMIN — SERTRALINE HYDROCHLORIDE 1 MG: 50 TABLET ORAL at 20:58

## 2018-07-23 RX ADMIN — POTASSIUM CHLORIDE 1 MEQ: 10 TABLET, EXTENDED RELEASE ORAL at 09:04

## 2018-07-23 RX ADMIN — VANCOMYCIN HYDROCHLORIDE 1 MLS/HR: 500 INJECTION, POWDER, LYOPHILIZED, FOR SOLUTION INTRAVENOUS at 23:34

## 2018-07-23 RX ADMIN — NYSTATIN 1 APPLIC: 100000 CREAM TOPICAL at 09:07

## 2018-07-23 RX ADMIN — INSULIN ASPART 1 UNIT: 100 INJECTION, SOLUTION INTRAVENOUS; SUBCUTANEOUS at 21:00

## 2018-07-23 RX ADMIN — ENOXAPARIN SODIUM 1 MG: 100 INJECTION SUBCUTANEOUS at 21:21

## 2018-07-23 RX ADMIN — FERROUS SULFATE TAB 325 MG (65 MG ELEMENTAL FE) 1 MG: 325 (65 FE) TAB at 09:04

## 2018-07-23 RX ADMIN — QUETIAPINE FUMARATE 1 MG: 25 TABLET ORAL at 20:58

## 2018-07-23 RX ADMIN — METOPROLOL TARTRATE 1 MG: 25 TABLET ORAL at 20:59

## 2018-07-23 RX ADMIN — CASTOR OIL AND BALSAM, PERU 1 APPLIC: 788; 87 OINTMENT TOPICAL at 20:56

## 2018-07-23 RX ADMIN — Medication 1 CAP: at 20:58

## 2018-07-23 RX ADMIN — METOPROLOL TARTRATE 1 MG: 25 TABLET ORAL at 09:05

## 2018-07-23 RX ADMIN — INSULIN ASPART 1 UNIT: 100 INJECTION, SOLUTION INTRAVENOUS; SUBCUTANEOUS at 07:55

## 2018-07-23 RX ADMIN — INSULIN ASPART 1 UNIT: 100 INJECTION, SOLUTION INTRAVENOUS; SUBCUTANEOUS at 12:05

## 2018-07-23 RX ADMIN — CASTOR OIL AND BALSAM, PERU 1 APPLIC: 788; 87 OINTMENT TOPICAL at 09:06

## 2018-07-23 RX ADMIN — LORATADINE 1 MG: 10 TABLET ORAL at 09:04

## 2018-07-23 RX ADMIN — PREGABALIN 1 MG: 50 CAPSULE ORAL at 09:04

## 2018-07-23 RX ADMIN — VITAMIN C 1 CAP: TAB at 09:00

## 2018-07-23 RX ADMIN — HYDROCODONE BITARTRATE AND ACETAMINOPHEN 1 TAB: 5; 325 TABLET ORAL at 12:40

## 2018-07-23 RX ADMIN — INSULIN ASPART 1 UNIT: 100 INJECTION, SOLUTION INTRAVENOUS; SUBCUTANEOUS at 17:14

## 2018-07-23 RX ADMIN — NYSTATIN 1 APPLIC: 100000 CREAM TOPICAL at 20:59

## 2018-07-24 LAB
ADD MAN DIFF?: NO
ALANINE AMINOTRANSFERASE: 22 IU/L (ref 13–69)
ALBUMIN/GLOBULIN RATIO: 0.81
ALBUMIN: 2.7 G/DL (ref 3.3–4.9)
ALKALINE PHOSPHATASE: 63 IU/L (ref 42–121)
ANION GAP: 10 (ref 8–16)
ASPARTATE AMINO TRANSFERASE: 20 IU/L (ref 15–46)
BASOPHIL #: 0 10^3/UL (ref 0–0.1)
BASOPHILS %: 0.3 % (ref 0–2)
BILIRUBIN,DIRECT: 0 MG/DL (ref 0–0.2)
BILIRUBIN,TOTAL: 0.5 MG/DL (ref 0.2–1.3)
BLOOD UREA NITROGEN: 14 MG/DL (ref 7–20)
CALCIUM: 8.2 MG/DL (ref 8.4–10.2)
CARBON DIOXIDE: 21 MMOL/L (ref 21–31)
CHLORIDE: 112 MMOL/L (ref 97–110)
CREATININE: 1.22 MG/DL (ref 0.44–1)
DIGOXIN: 0.6 NG/ML (ref 1–2)
EOSINOPHILS #: 0.3 10^3/UL (ref 0–0.5)
EOSINOPHILS %: 2.9 % (ref 0–7)
FOLATE: 6 NG/ML (ref 2.8–20)
GLOBULIN: 3.3 G/DL (ref 1.3–3.2)
GLUCOSE: 125 MG/DL (ref 70–220)
HEMATOCRIT: 27.4 % (ref 37–47)
HEMOGLOBIN: 8.4 G/DL (ref 12–16)
INR: 1.27
LYMPHOCYTES #: 1.1 10^3/UL (ref 0.8–2.9)
LYMPHOCYTES %: 10.2 % (ref 15–51)
MAGNESIUM: 2 MG/DL (ref 1.7–2.5)
MEAN CORPUSCULAR HEMOGLOBIN: 25.2 PG (ref 29–33)
MEAN CORPUSCULAR HGB CONC: 30.7 G/DL (ref 32–37)
MEAN CORPUSCULAR VOLUME: 82.3 FL (ref 82–101)
MEAN PLATELET VOLUME: 8.9 FL (ref 7.4–10.4)
MONOCYTE #: 0.7 10^3/UL (ref 0.3–0.9)
MONOCYTES %: 6.6 % (ref 0–11)
NEUTROPHIL #: 8.2 10^3/UL (ref 1.6–7.5)
NEUTROPHILS %: 79.2 % (ref 39–77)
NUCLEATED RED BLOOD CELLS #: 0 10^3/UL (ref 0–0)
NUCLEATED RED BLOOD CELLS%: 0 /100WBC (ref 0–0)
PHOSPHORUS: 3.6 MG/DL (ref 2.5–4.9)
PLATELET COUNT: 248 10^3/UL (ref 140–415)
POTASSIUM: 3.9 MMOL/L (ref 3.5–5.1)
PROTIME: 16.1 SEC (ref 11.9–14.9)
PT RATIO: 1.3
RAPID PLASMA REAGIN: NONREACTIVE
RED BLOOD COUNT: 3.33 10^6/UL (ref 4.2–5.4)
RED CELL DISTRIBUTION WIDTH: 15.2 % (ref 11.5–14.5)
SODIUM: 139 MMOL/L (ref 135–144)
THYROID STIMULATING HORMONE: 1.08 MIU/L (ref 0.47–4.68)
TOTAL PROTEIN: 6 G/DL (ref 6.1–8.1)
VITAMIN B12: 426 PG/ML (ref 239–931)
WHITE BLOOD COUNT: 10.3 10^3/UL (ref 4.8–10.8)

## 2018-07-24 RX ADMIN — PREGABALIN 1 MG: 50 CAPSULE ORAL at 09:35

## 2018-07-24 RX ADMIN — DIGOXIN 1 MG: 125 TABLET ORAL at 12:26

## 2018-07-24 RX ADMIN — VITAMIN C 1 CAP: TAB at 09:34

## 2018-07-24 RX ADMIN — INSULIN ASPART 1 UNIT: 100 INJECTION, SOLUTION INTRAVENOUS; SUBCUTANEOUS at 12:19

## 2018-07-24 RX ADMIN — PANTOPRAZOLE SODIUM 1 MG: 40 TABLET, DELAYED RELEASE ORAL at 09:35

## 2018-07-24 RX ADMIN — Medication 1 CAP: at 09:34

## 2018-07-24 RX ADMIN — HYDROCODONE BITARTRATE AND ACETAMINOPHEN 1 TAB: 5; 325 TABLET ORAL at 18:45

## 2018-07-24 RX ADMIN — NYSTATIN 1 APPLIC: 100000 CREAM TOPICAL at 09:36

## 2018-07-24 RX ADMIN — INSULIN ASPART 1 UNIT: 100 INJECTION, SOLUTION INTRAVENOUS; SUBCUTANEOUS at 07:52

## 2018-07-24 RX ADMIN — INSULIN ASPART 1 UNIT: 100 INJECTION, SOLUTION INTRAVENOUS; SUBCUTANEOUS at 17:38

## 2018-07-24 RX ADMIN — HYDROCODONE BITARTRATE AND ACETAMINOPHEN 1 TAB: 5; 325 TABLET ORAL at 08:42

## 2018-07-24 RX ADMIN — CASTOR OIL AND BALSAM, PERU 1 APPLIC: 788; 87 OINTMENT TOPICAL at 09:37

## 2018-07-24 RX ADMIN — METOPROLOL TARTRATE 1 MG: 25 TABLET ORAL at 09:35

## 2018-07-24 RX ADMIN — POTASSIUM CHLORIDE 1 MEQ: 10 TABLET, EXTENDED RELEASE ORAL at 09:34

## 2018-08-13 ENCOUNTER — HOSPITAL ENCOUNTER (INPATIENT)
Dept: HOSPITAL 91 - PP2 | Age: 83
LOS: 3 days | Discharge: NURSING, REHAB OR LONG-TERM CARE | DRG: 871 | End: 2018-08-16
Payer: MEDICARE

## 2018-08-13 ENCOUNTER — HOSPITAL ENCOUNTER (INPATIENT)
Age: 83
LOS: 3 days | Discharge: NURSING, REHAB OR LONG-TERM CARE | DRG: 871 | End: 2018-08-16

## 2018-08-13 DIAGNOSIS — I50.9: ICD-10-CM

## 2018-08-13 DIAGNOSIS — Z95.1: ICD-10-CM

## 2018-08-13 DIAGNOSIS — E86.0: ICD-10-CM

## 2018-08-13 DIAGNOSIS — N39.0: ICD-10-CM

## 2018-08-13 DIAGNOSIS — F44.4: ICD-10-CM

## 2018-08-13 DIAGNOSIS — F03.90: ICD-10-CM

## 2018-08-13 DIAGNOSIS — Z66: ICD-10-CM

## 2018-08-13 DIAGNOSIS — L89.154: ICD-10-CM

## 2018-08-13 DIAGNOSIS — Z79.82: ICD-10-CM

## 2018-08-13 DIAGNOSIS — I48.2: ICD-10-CM

## 2018-08-13 DIAGNOSIS — A41.9: Primary | ICD-10-CM

## 2018-08-13 DIAGNOSIS — I25.10: ICD-10-CM

## 2018-08-13 DIAGNOSIS — Z79.4: ICD-10-CM

## 2018-08-13 DIAGNOSIS — Z95.2: ICD-10-CM

## 2018-08-13 DIAGNOSIS — E11.9: ICD-10-CM

## 2018-08-13 DIAGNOSIS — N28.9: ICD-10-CM

## 2018-08-13 DIAGNOSIS — D63.8: ICD-10-CM

## 2018-08-13 DIAGNOSIS — Z90.49: ICD-10-CM

## 2018-08-13 LAB
ADD MAN DIFF?: NO
ADD UMIC: YES
ALANINE AMINOTRANSFERASE: 22 IU/L (ref 13–69)
ALBUMIN/GLOBULIN RATIO: 0.78
ALBUMIN: 3 G/DL (ref 3.3–4.9)
ALKALINE PHOSPHATASE: 109 IU/L (ref 42–121)
ANION GAP: 16 (ref 8–16)
ASPARTATE AMINO TRANSFERASE: 30 IU/L (ref 15–46)
BASOPHIL #: 0 10^3/UL (ref 0–0.1)
BASOPHILS %: 0.4 % (ref 0–2)
BILIRUBIN,DIRECT: 0 MG/DL (ref 0–0.2)
BILIRUBIN,TOTAL: 0.2 MG/DL (ref 0.2–1.3)
BLOOD UREA NITROGEN: 30 MG/DL (ref 7–20)
CALCIUM: 8.7 MG/DL (ref 8.4–10.2)
CARBON DIOXIDE: 26 MMOL/L (ref 21–31)
CHLORIDE: 99 MMOL/L (ref 97–110)
CREATININE: 1.15 MG/DL (ref 0.44–1)
EOSINOPHILS #: 0.2 10^3/UL (ref 0–0.5)
EOSINOPHILS %: 1.6 % (ref 0–7)
GLOBULIN: 3.8 G/DL (ref 1.3–3.2)
GLUCOSE: 179 MG/DL (ref 70–220)
HEMATOCRIT: 28.9 % (ref 37–47)
HEMOGLOBIN: 8.9 G/DL (ref 12–16)
IMMATURE GRANS #M: 0.14 10^3/UL
IMMATURE GRANS % (M): 1.4 %
INR: 1.11
LACTIC ACID: 1 MMOL/L (ref 0.5–2)
LACTIC ACID: 1.3 MMOL/L (ref 0.5–2)
LACTIC ACID: 3.3 MMOL/L (ref 0.5–2)
LYMPHOCYTES #: 1.6 10^3/UL (ref 0.8–2.9)
LYMPHOCYTES %: 16.5 % (ref 15–51)
MEAN CORPUSCULAR HEMOGLOBIN: 24.9 PG (ref 29–33)
MEAN CORPUSCULAR HGB CONC: 30.8 G/DL (ref 32–37)
MEAN CORPUSCULAR VOLUME: 80.7 FL (ref 82–101)
MEAN PLATELET VOLUME: 8.4 FL (ref 7.4–10.4)
MONOCYTE #: 1.1 10^3/UL (ref 0.3–0.9)
MONOCYTES %: 10.9 % (ref 0–11)
NEUTROPHIL #: 6.9 10^3/UL (ref 1.6–7.5)
NEUTROPHILS %: 69.2 % (ref 39–77)
NUCLEATED RED BLOOD CELLS #: 0 10^3/UL (ref 0–0)
NUCLEATED RED BLOOD CELLS%: 0 /100WBC (ref 0–0)
PARTIAL THROMBOPLASTIN TIME: 29.8 SEC (ref 25–35)
PLATELET COUNT: 359 10^3/UL (ref 140–415)
POTASSIUM: 5 MMOL/L (ref 3.5–5.1)
PROTIME: 14.5 SEC (ref 11.9–14.9)
PT RATIO: 1.1
RED BLOOD COUNT: 3.58 10^6/UL (ref 4.2–5.4)
RED CELL DISTRIBUTION WIDTH: 15.4 % (ref 11.5–14.5)
SODIUM: 136 MMOL/L (ref 135–144)
TOTAL PROTEIN: 6.8 G/DL (ref 6.1–8.1)
TROPONIN-I: < 0.01 NG/ML (ref 0–0.12)
UR ASCORBIC ACID: 40 MG/DL
UR BACTERIA: (no result) /HPF
UR BILIRUBIN (DIP): NEGATIVE MG/DL
UR BLOOD (DIP): (no result) MG/DL
UR CLARITY: (no result)
UR COLOR: (no result)
UR GLUCOSE (DIP): NEGATIVE MG/DL
UR KETONES (DIP): NEGATIVE MG/DL
UR LEUKOCYTE ESTERASE (DIP): (no result) LEU/UL
UR NITRITE (DIP): NEGATIVE MG/DL
UR PH (DIP): 6 (ref 5–9)
UR RBC: 16 /HPF (ref 0–5)
UR SPECIFIC GRAVITY (DIP): 1.02 (ref 1–1.03)
UR SQUAMOUS EPITHELIAL CELL: (no result) /HPF
UR TOTAL PROTEIN (DIP): (no result) MG/DL
UR UROBILINOGEN (DIP): NEGATIVE MG/DL
UR WBC: > 182 /HPF (ref 0–5)
WHITE BLOOD COUNT: 9.9 10^3/UL (ref 4.8–10.8)

## 2018-08-13 PROCEDURE — 87040 BLOOD CULTURE FOR BACTERIA: CPT

## 2018-08-13 PROCEDURE — 93005 ELECTROCARDIOGRAM TRACING: CPT

## 2018-08-13 PROCEDURE — 84484 ASSAY OF TROPONIN QUANT: CPT

## 2018-08-13 PROCEDURE — 71045 X-RAY EXAM CHEST 1 VIEW: CPT

## 2018-08-13 PROCEDURE — 85730 THROMBOPLASTIN TIME PARTIAL: CPT

## 2018-08-13 PROCEDURE — 83605 ASSAY OF LACTIC ACID: CPT

## 2018-08-13 PROCEDURE — 99291 CRITICAL CARE FIRST HOUR: CPT

## 2018-08-13 PROCEDURE — 92526 ORAL FUNCTION THERAPY: CPT

## 2018-08-13 PROCEDURE — 87081 CULTURE SCREEN ONLY: CPT

## 2018-08-13 PROCEDURE — 87070 CULTURE OTHR SPECIMN AEROBIC: CPT

## 2018-08-13 PROCEDURE — 92610 EVALUATE SWALLOWING FUNCTION: CPT

## 2018-08-13 PROCEDURE — 81001 URINALYSIS AUTO W/SCOPE: CPT

## 2018-08-13 PROCEDURE — 87086 URINE CULTURE/COLONY COUNT: CPT

## 2018-08-13 PROCEDURE — 80053 COMPREHEN METABOLIC PANEL: CPT

## 2018-08-13 PROCEDURE — 80048 BASIC METABOLIC PNL TOTAL CA: CPT

## 2018-08-13 PROCEDURE — 82962 GLUCOSE BLOOD TEST: CPT

## 2018-08-13 PROCEDURE — 85025 COMPLETE CBC W/AUTO DIFF WBC: CPT

## 2018-08-13 PROCEDURE — 85610 PROTHROMBIN TIME: CPT

## 2018-08-13 RX ADMIN — VANCOMYCIN HYDROCHLORIDE 1 MLS/HR: 1 INJECTION, POWDER, LYOPHILIZED, FOR SOLUTION INTRAVENOUS at 20:47

## 2018-08-13 RX ADMIN — THIAMINE HYDROCHLORIDE 1 ML: 100 INJECTION, SOLUTION INTRAMUSCULAR; INTRAVENOUS at 18:54

## 2018-08-13 RX ADMIN — MEROPENEM 1 MLS/HR: 1 INJECTION, POWDER, FOR SOLUTION INTRAVENOUS at 19:53

## 2018-08-14 RX ADMIN — INSULIN ASPART 1 UNIT: 100 INJECTION, SOLUTION INTRAVENOUS; SUBCUTANEOUS at 09:51

## 2018-08-14 RX ADMIN — INSULIN ASPART 1 UNIT: 100 INJECTION, SOLUTION INTRAVENOUS; SUBCUTANEOUS at 12:00

## 2018-08-14 RX ADMIN — HYDROCODONE BITARTRATE AND ACETAMINOPHEN 1 TAB: 5; 325 TABLET ORAL at 20:41

## 2018-08-14 RX ADMIN — INSULIN ASPART 1 UNIT: 100 INJECTION, SOLUTION INTRAVENOUS; SUBCUTANEOUS at 20:34

## 2018-08-14 RX ADMIN — CEFEPIME 1 MLS/HR: 1 INJECTION, POWDER, FOR SOLUTION INTRAMUSCULAR; INTRAVENOUS at 12:14

## 2018-08-14 RX ADMIN — CLOPIDOGREL 1 MG: 75 TABLET, FILM COATED ORAL at 09:00

## 2018-08-14 RX ADMIN — ACETAMINOPHEN 1 MG: 325 TABLET, FILM COATED ORAL at 00:54

## 2018-08-14 RX ADMIN — HYDROCODONE BITARTRATE AND ACETAMINOPHEN 1 TAB: 5; 325 TABLET ORAL at 05:00

## 2018-08-14 RX ADMIN — QUETIAPINE FUMARATE 1 MG: 25 TABLET ORAL at 20:33

## 2018-08-14 RX ADMIN — DIGOXIN 1 MG: 125 TABLET ORAL at 13:30

## 2018-08-14 RX ADMIN — CARBOXYMETHYLCELLULOSE SODIUM 1 DROP: 5 SOLUTION/ DROPS OPHTHALMIC at 20:34

## 2018-08-14 RX ADMIN — HYDROMORPHONE HYDROCHLORIDE 1 MG: 1 INJECTION, SOLUTION INTRAMUSCULAR; INTRAVENOUS; SUBCUTANEOUS at 05:55

## 2018-08-14 RX ADMIN — INSULIN ASPART 1 UNIT: 100 INJECTION, SOLUTION INTRAVENOUS; SUBCUTANEOUS at 18:00

## 2018-08-14 RX ADMIN — HYDROCODONE BITARTRATE AND ACETAMINOPHEN 1 TAB: 5; 325 TABLET ORAL at 13:41

## 2018-08-14 RX ADMIN — CARBOXYMETHYLCELLULOSE SODIUM 1 DROP: 5 SOLUTION/ DROPS OPHTHALMIC at 18:36

## 2018-08-14 RX ADMIN — CARBOXYMETHYLCELLULOSE SODIUM 1 DROP: 5 SOLUTION/ DROPS OPHTHALMIC at 12:06

## 2018-08-14 RX ADMIN — CARBOXYMETHYLCELLULOSE SODIUM 1 DROP: 5 SOLUTION/ DROPS OPHTHALMIC at 09:00

## 2018-08-14 RX ADMIN — CLOPIDOGREL 1 MG: 75 TABLET, FILM COATED ORAL at 13:31

## 2018-08-14 RX ADMIN — CEFEPIME 1 MLS/HR: 1 INJECTION, POWDER, FOR SOLUTION INTRAMUSCULAR; INTRAVENOUS at 20:35

## 2018-08-14 RX ADMIN — VANCOMYCIN HYDROCHLORIDE 1 MLS/HR: 500 INJECTION, POWDER, LYOPHILIZED, FOR SOLUTION INTRAVENOUS at 20:31

## 2018-08-15 LAB
ADD MAN DIFF?: NO
ANION GAP: 12 (ref 8–16)
BASOPHIL #: 0 10^3/UL (ref 0–0.1)
BASOPHILS %: 0.4 % (ref 0–2)
BLOOD UREA NITROGEN: 23 MG/DL (ref 7–20)
CALCIUM: 8.4 MG/DL (ref 8.4–10.2)
CARBON DIOXIDE: 25 MMOL/L (ref 21–31)
CHLORIDE: 105 MMOL/L (ref 97–110)
CREATININE: 0.93 MG/DL (ref 0.44–1)
EOSINOPHILS #: 0.2 10^3/UL (ref 0–0.5)
EOSINOPHILS %: 2.8 % (ref 0–7)
GLUCOSE: 121 MG/DL (ref 70–220)
HEMATOCRIT: 28.1 % (ref 37–47)
HEMOGLOBIN: 8.4 G/DL (ref 12–16)
IMMATURE GRANS #M: 0.14 10^3/UL
IMMATURE GRANS % (M): 1.6 %
LYMPHOCYTES #: 1.2 10^3/UL (ref 0.8–2.9)
LYMPHOCYTES %: 13.8 % (ref 15–51)
MEAN CORPUSCULAR HEMOGLOBIN: 24.3 PG (ref 29–33)
MEAN CORPUSCULAR HGB CONC: 29.9 G/DL (ref 32–37)
MEAN CORPUSCULAR VOLUME: 81.4 FL (ref 82–101)
MEAN PLATELET VOLUME: 8.4 FL (ref 7.4–10.4)
MONOCYTE #: 0.6 10^3/UL (ref 0.3–0.9)
MONOCYTES %: 7.4 % (ref 0–11)
NEUTROPHIL #: 6.3 10^3/UL (ref 1.6–7.5)
NEUTROPHILS %: 74 % (ref 39–77)
NUCLEATED RED BLOOD CELLS #: 0 10^3/UL (ref 0–0)
NUCLEATED RED BLOOD CELLS%: 0 /100WBC (ref 0–0)
PLATELET COUNT: 284 10^3/UL (ref 140–415)
POTASSIUM: 4.4 MMOL/L (ref 3.5–5.1)
RED BLOOD COUNT: 3.45 10^6/UL (ref 4.2–5.4)
RED CELL DISTRIBUTION WIDTH: 15.7 % (ref 11.5–14.5)
SODIUM: 138 MMOL/L (ref 135–144)
WHITE BLOOD COUNT: 8.5 10^3/UL (ref 4.8–10.8)

## 2018-08-15 RX ADMIN — HYDROCODONE BITARTRATE AND ACETAMINOPHEN 1 TAB: 5; 325 TABLET ORAL at 06:14

## 2018-08-15 RX ADMIN — CEFEPIME 1 MLS/HR: 1 INJECTION, POWDER, FOR SOLUTION INTRAMUSCULAR; INTRAVENOUS at 08:33

## 2018-08-15 RX ADMIN — PANTOPRAZOLE SODIUM 1 MG: 40 TABLET, DELAYED RELEASE ORAL at 06:14

## 2018-08-15 RX ADMIN — DEXTROSE, SODIUM CHLORIDE, AND POTASSIUM CHLORIDE 1 MLS/HR: 5; .45; .15 INJECTION INTRAVENOUS at 14:59

## 2018-08-15 RX ADMIN — QUETIAPINE FUMARATE 1 MG: 25 TABLET ORAL at 20:30

## 2018-08-15 RX ADMIN — CARBOXYMETHYLCELLULOSE SODIUM 1 DROP: 5 SOLUTION/ DROPS OPHTHALMIC at 13:17

## 2018-08-15 RX ADMIN — CARBOXYMETHYLCELLULOSE SODIUM 1 DROP: 5 SOLUTION/ DROPS OPHTHALMIC at 08:34

## 2018-08-15 RX ADMIN — INSULIN ASPART 1 UNIT: 100 INJECTION, SOLUTION INTRAVENOUS; SUBCUTANEOUS at 20:38

## 2018-08-15 RX ADMIN — DIGOXIN 1 MG: 125 TABLET ORAL at 13:17

## 2018-08-15 RX ADMIN — MORPHINE SULFATE 1 MG: 2 INJECTION, SOLUTION INTRAMUSCULAR; INTRAVENOUS at 10:47

## 2018-08-15 RX ADMIN — CARBOXYMETHYLCELLULOSE SODIUM 1 DROP: 5 SOLUTION/ DROPS OPHTHALMIC at 17:00

## 2018-08-15 RX ADMIN — CEFEPIME 1 MLS/HR: 1 INJECTION, POWDER, FOR SOLUTION INTRAMUSCULAR; INTRAVENOUS at 22:10

## 2018-08-15 RX ADMIN — CLOPIDOGREL 1 MG: 75 TABLET, FILM COATED ORAL at 09:00

## 2018-08-15 RX ADMIN — VANCOMYCIN HYDROCHLORIDE 1 MLS/HR: 500 INJECTION, POWDER, LYOPHILIZED, FOR SOLUTION INTRAVENOUS at 20:22

## 2018-08-15 RX ADMIN — COLLAGENASE SANTYL 1 APPLIC: 250 OINTMENT TOPICAL at 16:16

## 2018-08-15 RX ADMIN — CARBOXYMETHYLCELLULOSE SODIUM 1 DROP: 5 SOLUTION/ DROPS OPHTHALMIC at 20:30

## 2018-08-15 RX ADMIN — INSULIN ASPART 1 UNIT: 100 INJECTION, SOLUTION INTRAVENOUS; SUBCUTANEOUS at 08:00

## 2018-08-15 RX ADMIN — INSULIN ASPART 1 UNIT: 100 INJECTION, SOLUTION INTRAVENOUS; SUBCUTANEOUS at 18:00

## 2018-08-15 RX ADMIN — MORPHINE SULFATE 1 MG: 2 INJECTION, SOLUTION INTRAMUSCULAR; INTRAVENOUS at 20:28

## 2018-08-15 RX ADMIN — INSULIN ASPART 1 UNIT: 100 INJECTION, SOLUTION INTRAVENOUS; SUBCUTANEOUS at 12:00

## 2018-08-16 RX ADMIN — CEFEPIME 1 MLS/HR: 1 INJECTION, POWDER, FOR SOLUTION INTRAMUSCULAR; INTRAVENOUS at 09:24

## 2018-08-16 RX ADMIN — COLLAGENASE SANTYL 1 APPLIC: 250 OINTMENT TOPICAL at 03:30

## 2018-08-16 RX ADMIN — DEXTROSE, SODIUM CHLORIDE, AND POTASSIUM CHLORIDE 1 MLS/HR: 5; .45; .15 INJECTION INTRAVENOUS at 09:44

## 2018-08-16 RX ADMIN — INSULIN ASPART 1 UNIT: 100 INJECTION, SOLUTION INTRAVENOUS; SUBCUTANEOUS at 12:00

## 2018-08-16 RX ADMIN — CARBOXYMETHYLCELLULOSE SODIUM 1 DROP: 5 SOLUTION/ DROPS OPHTHALMIC at 17:00

## 2018-08-16 RX ADMIN — INSULIN ASPART 1 UNIT: 100 INJECTION, SOLUTION INTRAVENOUS; SUBCUTANEOUS at 18:05

## 2018-08-16 RX ADMIN — CARBOXYMETHYLCELLULOSE SODIUM 1 DROP: 5 SOLUTION/ DROPS OPHTHALMIC at 13:00

## 2018-08-16 RX ADMIN — INSULIN ASPART 1 UNIT: 100 INJECTION, SOLUTION INTRAVENOUS; SUBCUTANEOUS at 08:00

## 2018-08-16 RX ADMIN — MORPHINE SULFATE 1 MG: 2 INJECTION, SOLUTION INTRAMUSCULAR; INTRAVENOUS at 03:29

## 2018-08-16 RX ADMIN — CLOPIDOGREL 1 MG: 75 TABLET, FILM COATED ORAL at 09:25

## 2018-08-16 RX ADMIN — MORPHINE SULFATE 1 MG: 2 INJECTION, SOLUTION INTRAMUSCULAR; INTRAVENOUS at 16:28

## 2018-08-16 RX ADMIN — DIGOXIN 1 MG: 125 TABLET ORAL at 16:27

## 2018-08-16 RX ADMIN — DEXTROSE, SODIUM CHLORIDE, AND POTASSIUM CHLORIDE 1 MLS/HR: 5; .45; .15 INJECTION INTRAVENOUS at 03:50

## 2018-08-16 RX ADMIN — COLLAGENASE SANTYL 1 APPLIC: 250 OINTMENT TOPICAL at 09:24

## 2018-08-16 RX ADMIN — PANTOPRAZOLE SODIUM 1 MG: 40 TABLET, DELAYED RELEASE ORAL at 09:24

## 2018-08-16 RX ADMIN — CARBOXYMETHYLCELLULOSE SODIUM 1 DROP: 5 SOLUTION/ DROPS OPHTHALMIC at 09:00
